# Patient Record
Sex: FEMALE | Race: WHITE | NOT HISPANIC OR LATINO
[De-identification: names, ages, dates, MRNs, and addresses within clinical notes are randomized per-mention and may not be internally consistent; named-entity substitution may affect disease eponyms.]

---

## 2018-10-30 ENCOUNTER — RESULT REVIEW (OUTPATIENT)
Age: 33
End: 2018-10-30

## 2020-01-28 ENCOUNTER — RESULT REVIEW (OUTPATIENT)
Age: 35
End: 2020-01-28

## 2020-09-14 ENCOUNTER — OUTPATIENT (OUTPATIENT)
Dept: OUTPATIENT SERVICES | Facility: HOSPITAL | Age: 35
LOS: 1 days | End: 2020-09-14
Payer: COMMERCIAL

## 2020-09-14 DIAGNOSIS — Z3A.00 WEEKS OF GESTATION OF PREGNANCY NOT SPECIFIED: ICD-10-CM

## 2020-09-14 DIAGNOSIS — O26.899 OTHER SPECIFIED PREGNANCY RELATED CONDITIONS, UNSPECIFIED TRIMESTER: ICD-10-CM

## 2020-09-14 PROCEDURE — 59025 FETAL NON-STRESS TEST: CPT

## 2020-09-14 PROCEDURE — G0463: CPT

## 2020-09-15 ENCOUNTER — INPATIENT (INPATIENT)
Facility: HOSPITAL | Age: 35
LOS: 1 days | Discharge: HOME | End: 2020-09-17
Attending: OBSTETRICS & GYNECOLOGY | Admitting: OBSTETRICS & GYNECOLOGY
Payer: COMMERCIAL

## 2020-09-15 VITALS — HEIGHT: 66 IN | WEIGHT: 147.93 LBS

## 2020-09-15 DIAGNOSIS — Z34.80 ENCOUNTER FOR SUPERVISION OF OTHER NORMAL PREGNANCY, UNSPECIFIED TRIMESTER: ICD-10-CM

## 2020-09-15 DIAGNOSIS — O26.899 OTHER SPECIFIED PREGNANCY RELATED CONDITIONS, UNSPECIFIED TRIMESTER: ICD-10-CM

## 2020-09-15 DIAGNOSIS — Z3A.00 WEEKS OF GESTATION OF PREGNANCY NOT SPECIFIED: ICD-10-CM

## 2020-09-15 LAB
ANISOCYTOSIS BLD QL: SLIGHT — SIGNIFICANT CHANGE UP
BASO STIPL BLD QL SMEAR: PRESENT — SIGNIFICANT CHANGE UP
BASOPHILS # BLD AUTO: 0 K/UL — SIGNIFICANT CHANGE UP (ref 0–0.2)
BASOPHILS NFR BLD AUTO: 0 % — SIGNIFICANT CHANGE UP (ref 0–2)
BLD GP AB SCN SERPL QL: NEGATIVE — SIGNIFICANT CHANGE UP
DACRYOCYTES BLD QL SMEAR: SLIGHT — SIGNIFICANT CHANGE UP
ELLIPTOCYTES BLD QL SMEAR: SLIGHT — SIGNIFICANT CHANGE UP
EOSINOPHIL # BLD AUTO: 0 K/UL — SIGNIFICANT CHANGE UP (ref 0–0.5)
EOSINOPHIL NFR BLD AUTO: 0 % — SIGNIFICANT CHANGE UP (ref 0–6)
GIANT PLATELETS BLD QL SMEAR: PRESENT — SIGNIFICANT CHANGE UP
HCT VFR BLD CALC: 32 % — LOW (ref 34.5–45)
HGB BLD-MCNC: 10 G/DL — LOW (ref 11.5–15.5)
LYMPHOCYTES # BLD AUTO: 1.32 K/UL — SIGNIFICANT CHANGE UP (ref 1–3.3)
LYMPHOCYTES # BLD AUTO: 9.6 % — LOW (ref 13–44)
MANUAL SMEAR VERIFICATION: SIGNIFICANT CHANGE UP
MCHC RBC-ENTMCNC: 22.4 PG — LOW (ref 27–34)
MCHC RBC-ENTMCNC: 31.3 GM/DL — LOW (ref 32–36)
MCV RBC AUTO: 71.7 FL — LOW (ref 80–100)
MICROCYTES BLD QL: SLIGHT — SIGNIFICANT CHANGE UP
MONOCYTES # BLD AUTO: 0.36 K/UL — SIGNIFICANT CHANGE UP (ref 0–0.9)
MONOCYTES NFR BLD AUTO: 2.6 % — SIGNIFICANT CHANGE UP (ref 2–14)
NEUTROPHILS # BLD AUTO: 12.09 K/UL — HIGH (ref 1.8–7.4)
NEUTROPHILS NFR BLD AUTO: 87.8 % — HIGH (ref 43–77)
PLAT MORPH BLD: NORMAL — SIGNIFICANT CHANGE UP
PLATELET # BLD AUTO: 255 K/UL — SIGNIFICANT CHANGE UP (ref 150–400)
POIKILOCYTOSIS BLD QL AUTO: SLIGHT — SIGNIFICANT CHANGE UP
POLYCHROMASIA BLD QL SMEAR: SLIGHT — SIGNIFICANT CHANGE UP
RBC # BLD: 4.46 M/UL — SIGNIFICANT CHANGE UP (ref 3.8–5.2)
RBC # FLD: 15.9 % — HIGH (ref 10.3–14.5)
RBC BLD AUTO: ABNORMAL
RH IG SCN BLD-IMP: POSITIVE — SIGNIFICANT CHANGE UP
SARS-COV-2 RNA SPEC QL NAA+PROBE: SIGNIFICANT CHANGE UP
WBC # BLD: 13.77 K/UL — HIGH (ref 3.8–10.5)
WBC # FLD AUTO: 13.77 K/UL — HIGH (ref 3.8–10.5)

## 2020-09-15 RX ORDER — OXYTOCIN 10 UNIT/ML
2 VIAL (ML) INJECTION
Qty: 30 | Refills: 0 | Status: DISCONTINUED | OUTPATIENT
Start: 2020-09-15 | End: 2020-09-17

## 2020-09-15 RX ORDER — OXYTOCIN 10 UNIT/ML
333.33 VIAL (ML) INJECTION
Qty: 20 | Refills: 0 | Status: DISCONTINUED | OUTPATIENT
Start: 2020-09-15 | End: 2020-09-16

## 2020-09-15 RX ORDER — INFLUENZA VIRUS VACCINE 15; 15; 15; 15 UG/.5ML; UG/.5ML; UG/.5ML; UG/.5ML
0.5 SUSPENSION INTRAMUSCULAR ONCE
Refills: 0 | Status: COMPLETED | OUTPATIENT
Start: 2020-09-15 | End: 2020-09-15

## 2020-09-15 RX ORDER — SODIUM CHLORIDE 9 MG/ML
1000 INJECTION, SOLUTION INTRAVENOUS
Refills: 0 | Status: DISCONTINUED | OUTPATIENT
Start: 2020-09-15 | End: 2020-09-16

## 2020-09-15 RX ORDER — CITRIC ACID/SODIUM CITRATE 300-500 MG
15 SOLUTION, ORAL ORAL EVERY 6 HOURS
Refills: 0 | Status: DISCONTINUED | OUTPATIENT
Start: 2020-09-15 | End: 2020-09-16

## 2020-09-15 RX ORDER — LEVOTHYROXINE SODIUM 125 MCG
1 TABLET ORAL
Qty: 0 | Refills: 0 | DISCHARGE

## 2020-09-15 RX ADMIN — SODIUM CHLORIDE 125 MILLILITER(S): 9 INJECTION, SOLUTION INTRAVENOUS at 21:12

## 2020-09-15 RX ADMIN — Medication 2 MILLIUNIT(S)/MIN: at 21:13

## 2020-09-16 LAB
SARS-COV-2 IGG SERPL QL IA: NEGATIVE — SIGNIFICANT CHANGE UP
SARS-COV-2 IGM SERPL IA-ACNC: 0.08 INDEX — SIGNIFICANT CHANGE UP
T PALLIDUM AB TITR SER: NEGATIVE — SIGNIFICANT CHANGE UP

## 2020-09-16 RX ORDER — IBUPROFEN 200 MG
600 TABLET ORAL EVERY 6 HOURS
Refills: 0 | Status: COMPLETED | OUTPATIENT
Start: 2020-09-16 | End: 2021-08-15

## 2020-09-16 RX ORDER — LANOLIN
1 OINTMENT (GRAM) TOPICAL EVERY 6 HOURS
Refills: 0 | Status: DISCONTINUED | OUTPATIENT
Start: 2020-09-16 | End: 2020-09-17

## 2020-09-16 RX ORDER — OXYCODONE HYDROCHLORIDE 5 MG/1
5 TABLET ORAL ONCE
Refills: 0 | Status: DISCONTINUED | OUTPATIENT
Start: 2020-09-16 | End: 2020-09-17

## 2020-09-16 RX ORDER — KETOROLAC TROMETHAMINE 30 MG/ML
30 SYRINGE (ML) INJECTION ONCE
Refills: 0 | Status: DISCONTINUED | OUTPATIENT
Start: 2020-09-16 | End: 2020-09-16

## 2020-09-16 RX ORDER — SODIUM CHLORIDE 9 MG/ML
3 INJECTION INTRAMUSCULAR; INTRAVENOUS; SUBCUTANEOUS EVERY 8 HOURS
Refills: 0 | Status: DISCONTINUED | OUTPATIENT
Start: 2020-09-16 | End: 2020-09-16

## 2020-09-16 RX ORDER — DIPHENHYDRAMINE HCL 50 MG
25 CAPSULE ORAL EVERY 6 HOURS
Refills: 0 | Status: DISCONTINUED | OUTPATIENT
Start: 2020-09-16 | End: 2020-09-17

## 2020-09-16 RX ORDER — OXYCODONE HYDROCHLORIDE 5 MG/1
5 TABLET ORAL
Refills: 0 | Status: DISCONTINUED | OUTPATIENT
Start: 2020-09-16 | End: 2020-09-17

## 2020-09-16 RX ORDER — HYDROCORTISONE 1 %
1 OINTMENT (GRAM) TOPICAL EVERY 6 HOURS
Refills: 0 | Status: DISCONTINUED | OUTPATIENT
Start: 2020-09-16 | End: 2020-09-17

## 2020-09-16 RX ORDER — LEVOTHYROXINE SODIUM 125 MCG
75 TABLET ORAL DAILY
Refills: 0 | Status: DISCONTINUED | OUTPATIENT
Start: 2020-09-16 | End: 2020-09-17

## 2020-09-16 RX ORDER — BENZOCAINE 10 %
1 GEL (GRAM) MUCOUS MEMBRANE EVERY 6 HOURS
Refills: 0 | Status: DISCONTINUED | OUTPATIENT
Start: 2020-09-16 | End: 2020-09-17

## 2020-09-16 RX ORDER — TETANUS TOXOID, REDUCED DIPHTHERIA TOXOID AND ACELLULAR PERTUSSIS VACCINE, ADSORBED 5; 2.5; 8; 8; 2.5 [IU]/.5ML; [IU]/.5ML; UG/.5ML; UG/.5ML; UG/.5ML
0.5 SUSPENSION INTRAMUSCULAR ONCE
Refills: 0 | Status: DISCONTINUED | OUTPATIENT
Start: 2020-09-16 | End: 2020-09-17

## 2020-09-16 RX ORDER — MAGNESIUM HYDROXIDE 400 MG/1
30 TABLET, CHEWABLE ORAL
Refills: 0 | Status: DISCONTINUED | OUTPATIENT
Start: 2020-09-16 | End: 2020-09-17

## 2020-09-16 RX ORDER — DIBUCAINE 1 %
1 OINTMENT (GRAM) RECTAL EVERY 6 HOURS
Refills: 0 | Status: DISCONTINUED | OUTPATIENT
Start: 2020-09-16 | End: 2020-09-17

## 2020-09-16 RX ORDER — OXYTOCIN 10 UNIT/ML
333.33 VIAL (ML) INJECTION
Qty: 20 | Refills: 0 | Status: DISCONTINUED | OUTPATIENT
Start: 2020-09-16 | End: 2020-09-17

## 2020-09-16 RX ORDER — AER TRAVELER 0.5 G/1
1 SOLUTION RECTAL; TOPICAL EVERY 4 HOURS
Refills: 0 | Status: DISCONTINUED | OUTPATIENT
Start: 2020-09-16 | End: 2020-09-17

## 2020-09-16 RX ORDER — SIMETHICONE 80 MG/1
80 TABLET, CHEWABLE ORAL EVERY 4 HOURS
Refills: 0 | Status: DISCONTINUED | OUTPATIENT
Start: 2020-09-16 | End: 2020-09-17

## 2020-09-16 RX ORDER — ACETAMINOPHEN 500 MG
975 TABLET ORAL
Refills: 0 | Status: DISCONTINUED | OUTPATIENT
Start: 2020-09-16 | End: 2020-09-17

## 2020-09-16 RX ORDER — PRAMOXINE HYDROCHLORIDE 150 MG/15G
1 AEROSOL, FOAM RECTAL EVERY 4 HOURS
Refills: 0 | Status: DISCONTINUED | OUTPATIENT
Start: 2020-09-16 | End: 2020-09-17

## 2020-09-16 RX ORDER — IBUPROFEN 200 MG
600 TABLET ORAL EVERY 6 HOURS
Refills: 0 | Status: DISCONTINUED | OUTPATIENT
Start: 2020-09-16 | End: 2020-09-17

## 2020-09-16 RX ADMIN — Medication 1 TABLET(S): at 13:44

## 2020-09-16 RX ADMIN — Medication 600 MILLIGRAM(S): at 14:14

## 2020-09-16 RX ADMIN — Medication 975 MILLIGRAM(S): at 15:33

## 2020-09-16 RX ADMIN — Medication 975 MILLIGRAM(S): at 11:05

## 2020-09-16 RX ADMIN — Medication 600 MILLIGRAM(S): at 21:53

## 2020-09-16 RX ADMIN — Medication 600 MILLIGRAM(S): at 20:36

## 2020-09-16 RX ADMIN — Medication 600 MILLIGRAM(S): at 13:44

## 2020-09-16 RX ADMIN — Medication 975 MILLIGRAM(S): at 21:53

## 2020-09-16 RX ADMIN — Medication 975 MILLIGRAM(S): at 15:03

## 2020-09-16 RX ADMIN — Medication 1000 MILLIUNIT(S)/MIN: at 06:19

## 2020-09-16 RX ADMIN — Medication 975 MILLIGRAM(S): at 21:54

## 2020-09-16 RX ADMIN — Medication 975 MILLIGRAM(S): at 10:35

## 2020-09-17 ENCOUNTER — TRANSCRIPTION ENCOUNTER (OUTPATIENT)
Age: 35
End: 2020-09-17

## 2020-09-17 VITALS
SYSTOLIC BLOOD PRESSURE: 99 MMHG | DIASTOLIC BLOOD PRESSURE: 66 MMHG | TEMPERATURE: 98 F | RESPIRATION RATE: 18 BRPM | HEART RATE: 83 BPM

## 2020-09-17 PROCEDURE — 86850 RBC ANTIBODY SCREEN: CPT

## 2020-09-17 PROCEDURE — U0003: CPT

## 2020-09-17 PROCEDURE — 86769 SARS-COV-2 COVID-19 ANTIBODY: CPT

## 2020-09-17 PROCEDURE — 86900 BLOOD TYPING SEROLOGIC ABO: CPT

## 2020-09-17 PROCEDURE — 86780 TREPONEMA PALLIDUM: CPT

## 2020-09-17 PROCEDURE — 59025 FETAL NON-STRESS TEST: CPT

## 2020-09-17 PROCEDURE — 86901 BLOOD TYPING SEROLOGIC RH(D): CPT

## 2020-09-17 PROCEDURE — 59050 FETAL MONITOR W/REPORT: CPT

## 2020-09-17 PROCEDURE — 85025 COMPLETE CBC W/AUTO DIFF WBC: CPT

## 2020-09-17 PROCEDURE — G0463: CPT

## 2020-09-17 RX ORDER — IBUPROFEN 200 MG
1 TABLET ORAL
Qty: 0 | Refills: 0 | DISCHARGE
Start: 2020-09-17

## 2020-09-17 RX ORDER — ACETAMINOPHEN 500 MG
3 TABLET ORAL
Qty: 0 | Refills: 0 | DISCHARGE
Start: 2020-09-17

## 2020-09-17 RX ADMIN — Medication 600 MILLIGRAM(S): at 03:00

## 2020-09-17 RX ADMIN — Medication 975 MILLIGRAM(S): at 04:06

## 2020-09-17 RX ADMIN — Medication 600 MILLIGRAM(S): at 08:22

## 2020-09-17 RX ADMIN — Medication 975 MILLIGRAM(S): at 05:06

## 2020-09-17 RX ADMIN — Medication 1 TABLET(S): at 11:20

## 2020-09-17 RX ADMIN — Medication 975 MILLIGRAM(S): at 11:20

## 2020-09-17 RX ADMIN — Medication 75 MICROGRAM(S): at 05:53

## 2020-09-17 RX ADMIN — Medication 975 MILLIGRAM(S): at 11:50

## 2020-09-17 RX ADMIN — Medication 600 MILLIGRAM(S): at 02:30

## 2020-09-17 NOTE — PROGRESS NOTE ADULT - PROBLEM SELECTOR PLAN 1
- Continue with po analgesia, pain well controlled  - Increase ambulation, SCDs when not ambulating  - Continue regular diet  - No evidence of ongoing bleeding    Michell Rosenberg, PGY1
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
## 2020-09-17 NOTE — PROGRESS NOTE ADULT - SUBJECTIVE AND OBJECTIVE BOX
OB Progress Note:  PPD#1    S: 34yo PPD#1 s/p . Patient feels well. Pain is well controlled. She is tolerating a regular diet and passing flatus. She is voiding spontaneously, and ambulating without difficulty. Endorses light vaginal bleeding, soaking less than 1 pad/hour. Denies CP/SOB. Denies lightheadedness/dizziness. Denies N/V.     O:  Vitals:  Vital Signs Last 24 Hrs  T(C): 36.6 (17 Sep 2020 01:10), Max: 36.9 (16 Sep 2020 16:59)  T(F): 97.9 (17 Sep 2020 01:10), Max: 98.5 (16 Sep 2020 16:59)  HR: 96 (17 Sep 2020 01:10) (70 - 105)  BP: 109/71 (17 Sep 2020 01:10) (102/64 - 135/64)  BP(mean): --  RR: 18 (17 Sep 2020 01:10) (18 - 18)  SpO2: 97% (17 Sep 2020 01:10) (97% - 99%)    MEDICATIONS  (STANDING):  acetaminophen   Tablet .. 975 milliGRAM(s) Oral <User Schedule>  diphtheria/tetanus/pertussis (acellular) Vaccine (ADAcel) 0.5 milliLiter(s) IntraMuscular once  ibuprofen  Tablet. 600 milliGRAM(s) Oral every 6 hours  influenza   Vaccine 0.5 milliLiter(s) IntraMuscular once  levothyroxine 75 MICROGram(s) Oral daily  oxytocin Infusion 333.333 milliUNIT(s)/Min (1000 mL/Hr) IV Continuous <Continuous>  oxytocin Infusion 2 milliUNIT(s)/Min (2 mL/Hr) IV Continuous <Continuous>  prenatal multivitamin 1 Tablet(s) Oral daily      Labs:  Blood type: A Positive  Rubella IgG: RPR: Negative                          10.0<L>   13.77<H> >-----------< 255    ( 09-15 @ 19:37 )             32.0<L>                  Physical Exam:  General: NAD  Heart: all extremities well perfused  Lungs: breathing comfortably  Abdomen: soft, non-tender, non-distended, fundus firm  Vaginal: lochia wnl  Extremities: No calf tenderness or erythema

## 2020-09-17 NOTE — DISCHARGE NOTE OB - CARE PLAN
Principal Discharge DX:	Vaginal delivery  Goal:	home  Assessment and plan of treatment:	follow up in 6 weeks

## 2020-09-17 NOTE — DISCHARGE NOTE OB - PATIENT PORTAL LINK FT
You can access the FollowMyHealth Patient Portal offered by [facility] by registering at the following website: [email or web address]. By joining [employer]’s FollowMyHealth portal, you will also be able to view your health information using other applications (apps) compatible with our system.

## 2020-09-17 NOTE — DISCHARGE NOTE OB - MEDICATION SUMMARY - MEDICATIONS TO TAKE
I will START or STAY ON the medications listed below when I get home from the hospital:    acetaminophen 325 mg oral tablet  -- 3 tab(s) by mouth   -- Indication: For mild pain    ibuprofen 600 mg oral tablet  -- 1 tab(s) by mouth every 6 hours  -- Indication: For moderate pain    Prenatal Multivitamins oral tablet (obsolete)  -- 1 cap(s) by mouth once a day  -- Indication: For postpartum

## 2020-11-02 ENCOUNTER — RESULT REVIEW (OUTPATIENT)
Age: 35
End: 2020-11-02

## 2021-05-20 ENCOUNTER — RX RENEWAL (OUTPATIENT)
Age: 36
End: 2021-05-20

## 2021-05-24 ENCOUNTER — RX RENEWAL (OUTPATIENT)
Age: 36
End: 2021-05-24

## 2021-07-02 ENCOUNTER — NON-APPOINTMENT (OUTPATIENT)
Age: 36
End: 2021-07-02

## 2021-12-26 ENCOUNTER — NON-APPOINTMENT (OUTPATIENT)
Age: 36
End: 2021-12-26

## 2021-12-27 ENCOUNTER — APPOINTMENT (OUTPATIENT)
Dept: OBGYN | Facility: CLINIC | Age: 36
End: 2021-12-27
Payer: COMMERCIAL

## 2021-12-27 VITALS
WEIGHT: 134 LBS | DIASTOLIC BLOOD PRESSURE: 79 MMHG | HEIGHT: 67 IN | SYSTOLIC BLOOD PRESSURE: 116 MMHG | BODY MASS INDEX: 21.03 KG/M2

## 2021-12-27 DIAGNOSIS — Z00.00 ENCOUNTER FOR GENERAL ADULT MEDICAL EXAMINATION W/OUT ABNORMAL FINDINGS: ICD-10-CM

## 2021-12-27 DIAGNOSIS — Z31.69 ENCOUNTER FOR OTHER GENERAL COUNSELING AND ADVICE ON PROCREATION: ICD-10-CM

## 2021-12-27 DIAGNOSIS — Z30.09 ENCOUNTER FOR OTHER GENERAL COUNSELING AND ADVICE ON CONTRACEPTION: ICD-10-CM

## 2021-12-27 PROCEDURE — 99395 PREV VISIT EST AGE 18-39: CPT

## 2021-12-27 PROCEDURE — 36415 COLL VENOUS BLD VENIPUNCTURE: CPT

## 2021-12-27 NOTE — HISTORY OF PRESENT ILLNESS
[FreeTextEntry1] : 2021. KIRILL MACHADO 36 year old female  LMP 21, presents for annual gyn exam, PMH\par \par She feels well and has no complaints***.\par PMHx hypothyroid  and anemia \par \par preconception counseling last pregnancy was IUI and hypothyroid.  \par Cycle 31-34 days\par \par She reports monthly menses. She denies intermenstrual bleeding, abn vaginal discharge or vaginitis symptoms. No urinary complaints. BM is normal per patient. She denies abdominal and pelvic pain.\par \par Pt is sexually active with .  Denies sexual dysfunction.\par

## 2021-12-27 NOTE — PLAN
[FreeTextEntry1] : Routine Gyn Exam:\par BSE taught\par Pap smear conducted\par \par Preconception counseling - \par check TSH T4 h/o hypothyroid in pregnancy\par preeseed lubricant, myoinositol, start PNV\par \par \par RTO in 1 year or PRN\par

## 2021-12-28 LAB — HPV HIGH+LOW RISK DNA PNL CVX: NOT DETECTED

## 2021-12-30 LAB
T4 FREE SERPL-MCNC: 1.1 NG/DL
TSH SERPL-ACNC: 5.13 UIU/ML

## 2022-01-05 LAB — CYTOLOGY CVX/VAG DOC THIN PREP: NORMAL

## 2022-01-10 ENCOUNTER — TRANSCRIPTION ENCOUNTER (OUTPATIENT)
Age: 37
End: 2022-01-10

## 2022-06-09 ENCOUNTER — TRANSCRIPTION ENCOUNTER (OUTPATIENT)
Age: 37
End: 2022-06-09

## 2022-06-10 ENCOUNTER — INPATIENT (INPATIENT)
Facility: HOSPITAL | Age: 37
LOS: 0 days | Discharge: HOME | DRG: 817 | End: 2022-06-10
Attending: OBSTETRICS & GYNECOLOGY | Admitting: OBSTETRICS & GYNECOLOGY
Payer: COMMERCIAL

## 2022-06-10 ENCOUNTER — APPOINTMENT (OUTPATIENT)
Dept: OBGYN | Facility: CLINIC | Age: 37
End: 2022-06-10
Payer: COMMERCIAL

## 2022-06-10 ENCOUNTER — APPOINTMENT (OUTPATIENT)
Dept: OBGYN | Facility: CLINIC | Age: 37
End: 2022-06-10

## 2022-06-10 ENCOUNTER — RESULT REVIEW (OUTPATIENT)
Age: 37
End: 2022-06-10

## 2022-06-10 ENCOUNTER — ASOB RESULT (OUTPATIENT)
Age: 37
End: 2022-06-10

## 2022-06-10 ENCOUNTER — TRANSCRIPTION ENCOUNTER (OUTPATIENT)
Age: 37
End: 2022-06-10

## 2022-06-10 VITALS
OXYGEN SATURATION: 98 % | SYSTOLIC BLOOD PRESSURE: 105 MMHG | RESPIRATION RATE: 16 BRPM | HEART RATE: 99 BPM | DIASTOLIC BLOOD PRESSURE: 58 MMHG

## 2022-06-10 VITALS
DIASTOLIC BLOOD PRESSURE: 84 MMHG | HEART RATE: 126 BPM | RESPIRATION RATE: 19 BRPM | WEIGHT: 130.07 LBS | OXYGEN SATURATION: 100 % | HEIGHT: 66 IN | SYSTOLIC BLOOD PRESSURE: 122 MMHG | TEMPERATURE: 98 F

## 2022-06-10 DIAGNOSIS — O00.90 UNSPECIFIED ECTOPIC PREGNANCY WITHOUT INTRAUTERINE PREGNANCY: ICD-10-CM

## 2022-06-10 LAB
ALBUMIN SERPL ELPH-MCNC: 4.9 G/DL — SIGNIFICANT CHANGE UP (ref 3.3–5)
ALP SERPL-CCNC: 19 U/L — LOW (ref 40–120)
ALT FLD-CCNC: 13 U/L — SIGNIFICANT CHANGE UP (ref 10–45)
ANION GAP SERPL CALC-SCNC: 15 MMOL/L — SIGNIFICANT CHANGE UP (ref 5–17)
ANISOCYTOSIS BLD QL: SLIGHT — SIGNIFICANT CHANGE UP
APTT BLD: 30.4 SEC — SIGNIFICANT CHANGE UP (ref 27.5–35.5)
AST SERPL-CCNC: 14 U/L — SIGNIFICANT CHANGE UP (ref 10–40)
BASOPHILS # BLD AUTO: 0.09 K/UL — SIGNIFICANT CHANGE UP (ref 0–0.2)
BASOPHILS NFR BLD AUTO: 0.8 % — SIGNIFICANT CHANGE UP (ref 0–2)
BILIRUB SERPL-MCNC: 0.5 MG/DL — SIGNIFICANT CHANGE UP (ref 0.2–1.2)
BLD GP AB SCN SERPL QL: NEGATIVE — SIGNIFICANT CHANGE UP
BUN SERPL-MCNC: 12 MG/DL — SIGNIFICANT CHANGE UP (ref 7–23)
CALCIUM SERPL-MCNC: 9.5 MG/DL — SIGNIFICANT CHANGE UP (ref 8.4–10.5)
CHLORIDE SERPL-SCNC: 103 MMOL/L — SIGNIFICANT CHANGE UP (ref 96–108)
CO2 SERPL-SCNC: 20 MMOL/L — LOW (ref 22–31)
CREAT SERPL-MCNC: 0.77 MG/DL — SIGNIFICANT CHANGE UP (ref 0.5–1.3)
DACRYOCYTES BLD QL SMEAR: SLIGHT — SIGNIFICANT CHANGE UP
EGFR: 103 ML/MIN/1.73M2 — SIGNIFICANT CHANGE UP
ELLIPTOCYTES BLD QL SMEAR: SLIGHT — SIGNIFICANT CHANGE UP
EOSINOPHIL # BLD AUTO: 0 K/UL — SIGNIFICANT CHANGE UP (ref 0–0.5)
EOSINOPHIL NFR BLD AUTO: 0 % — SIGNIFICANT CHANGE UP (ref 0–6)
GLUCOSE SERPL-MCNC: 110 MG/DL — HIGH (ref 70–99)
HCG SERPL-ACNC: 2628 MIU/ML — HIGH
HCT VFR BLD CALC: 30.1 % — LOW (ref 34.5–45)
HGB BLD-MCNC: 9.5 G/DL — LOW (ref 11.5–15.5)
INR BLD: 1.17 RATIO — HIGH (ref 0.88–1.16)
LACTATE BLDV-MCNC: 1.6 MMOL/L — SIGNIFICANT CHANGE UP (ref 0.7–2)
LYMPHOCYTES # BLD AUTO: 18.8 % — SIGNIFICANT CHANGE UP (ref 13–44)
LYMPHOCYTES # BLD AUTO: 2 K/UL — SIGNIFICANT CHANGE UP (ref 1–3.3)
MANUAL SMEAR VERIFICATION: SIGNIFICANT CHANGE UP
MCHC RBC-ENTMCNC: 21.6 PG — LOW (ref 27–34)
MCHC RBC-ENTMCNC: 31.6 GM/DL — LOW (ref 32–36)
MCV RBC AUTO: 68.6 FL — LOW (ref 80–100)
MONOCYTES # BLD AUTO: 0.28 K/UL — SIGNIFICANT CHANGE UP (ref 0–0.9)
MONOCYTES NFR BLD AUTO: 2.6 % — SIGNIFICANT CHANGE UP (ref 2–14)
NEUTROPHILS # BLD AUTO: 8.29 K/UL — HIGH (ref 1.8–7.4)
NEUTROPHILS NFR BLD AUTO: 77.8 % — HIGH (ref 43–77)
PLAT MORPH BLD: NORMAL — SIGNIFICANT CHANGE UP
PLATELET # BLD AUTO: 280 K/UL — SIGNIFICANT CHANGE UP (ref 150–400)
POIKILOCYTOSIS BLD QL AUTO: SLIGHT — SIGNIFICANT CHANGE UP
POTASSIUM SERPL-MCNC: 3.1 MMOL/L — LOW (ref 3.5–5.3)
POTASSIUM SERPL-SCNC: 3.1 MMOL/L — LOW (ref 3.5–5.3)
PROT SERPL-MCNC: 8.3 G/DL — SIGNIFICANT CHANGE UP (ref 6–8.3)
PROTHROM AB SERPL-ACNC: 13.5 SEC — HIGH (ref 10.5–13.4)
RBC # BLD: 4.39 M/UL — SIGNIFICANT CHANGE UP (ref 3.8–5.2)
RBC # FLD: 14.3 % — SIGNIFICANT CHANGE UP (ref 10.3–14.5)
RBC BLD AUTO: ABNORMAL
RH IG SCN BLD-IMP: POSITIVE — SIGNIFICANT CHANGE UP
SARS-COV-2 RNA SPEC QL NAA+PROBE: SIGNIFICANT CHANGE UP
SCHISTOCYTES BLD QL AUTO: SLIGHT — SIGNIFICANT CHANGE UP
SODIUM SERPL-SCNC: 138 MMOL/L — SIGNIFICANT CHANGE UP (ref 135–145)
TARGETS BLD QL SMEAR: SLIGHT — SIGNIFICANT CHANGE UP
WBC # BLD: 10.66 K/UL — HIGH (ref 3.8–10.5)
WBC # FLD AUTO: 10.66 K/UL — HIGH (ref 3.8–10.5)

## 2022-06-10 PROCEDURE — 99285 EMERGENCY DEPT VISIT HI MDM: CPT

## 2022-06-10 PROCEDURE — 83605 ASSAY OF LACTIC ACID: CPT

## 2022-06-10 PROCEDURE — 80053 COMPREHEN METABOLIC PANEL: CPT

## 2022-06-10 PROCEDURE — 99215 OFFICE O/P EST HI 40 MIN: CPT | Mod: 25

## 2022-06-10 PROCEDURE — 58661 LAPAROSCOPY REMOVE ADNEXA: CPT

## 2022-06-10 PROCEDURE — 85025 COMPLETE CBC W/AUTO DIFF WBC: CPT

## 2022-06-10 PROCEDURE — 76817 TRANSVAGINAL US OBSTETRIC: CPT

## 2022-06-10 PROCEDURE — 88305 TISSUE EXAM BY PATHOLOGIST: CPT | Mod: 26

## 2022-06-10 PROCEDURE — 86901 BLOOD TYPING SEROLOGIC RH(D): CPT

## 2022-06-10 PROCEDURE — 84702 CHORIONIC GONADOTROPIN TEST: CPT

## 2022-06-10 PROCEDURE — C9399: CPT

## 2022-06-10 PROCEDURE — 36415 COLL VENOUS BLD VENIPUNCTURE: CPT

## 2022-06-10 PROCEDURE — 88305 TISSUE EXAM BY PATHOLOGIST: CPT

## 2022-06-10 PROCEDURE — 86900 BLOOD TYPING SEROLOGIC ABO: CPT

## 2022-06-10 PROCEDURE — 59151 TREAT ECTOPIC PREGNANCY: CPT

## 2022-06-10 PROCEDURE — 85610 PROTHROMBIN TIME: CPT

## 2022-06-10 PROCEDURE — 85730 THROMBOPLASTIN TIME PARTIAL: CPT

## 2022-06-10 PROCEDURE — 86850 RBC ANTIBODY SCREEN: CPT

## 2022-06-10 PROCEDURE — U0003: CPT

## 2022-06-10 PROCEDURE — 59151 TREAT ECTOPIC PREGNANCY: CPT | Mod: 80

## 2022-06-10 RX ORDER — HYDROMORPHONE HYDROCHLORIDE 2 MG/ML
0.5 INJECTION INTRAMUSCULAR; INTRAVENOUS; SUBCUTANEOUS
Refills: 0 | Status: DISCONTINUED | OUTPATIENT
Start: 2022-06-10 | End: 2022-06-10

## 2022-06-10 RX ORDER — OXYCODONE HYDROCHLORIDE 5 MG/1
1 TABLET ORAL
Qty: 6 | Refills: 0
Start: 2022-06-10

## 2022-06-10 RX ORDER — OXYCODONE HYDROCHLORIDE 5 MG/1
5 TABLET ORAL ONCE
Refills: 0 | Status: DISCONTINUED | OUTPATIENT
Start: 2022-06-10 | End: 2022-06-10

## 2022-06-10 RX ORDER — SODIUM CHLORIDE 9 MG/ML
1000 INJECTION, SOLUTION INTRAVENOUS
Refills: 0 | Status: DISCONTINUED | OUTPATIENT
Start: 2022-06-10 | End: 2022-06-10

## 2022-06-10 RX ORDER — ONDANSETRON 8 MG/1
4 TABLET, FILM COATED ORAL ONCE
Refills: 0 | Status: DISCONTINUED | OUTPATIENT
Start: 2022-06-10 | End: 2022-06-10

## 2022-06-10 RX ORDER — FENTANYL CITRATE 50 UG/ML
25 INJECTION INTRAVENOUS
Refills: 0 | Status: DISCONTINUED | OUTPATIENT
Start: 2022-06-10 | End: 2022-06-10

## 2022-06-10 RX ADMIN — OXYCODONE HYDROCHLORIDE 5 MILLIGRAM(S): 5 TABLET ORAL at 18:35

## 2022-06-10 RX ADMIN — FENTANYL CITRATE 25 MICROGRAM(S): 50 INJECTION INTRAVENOUS at 16:30

## 2022-06-10 RX ADMIN — OXYCODONE HYDROCHLORIDE 5 MILLIGRAM(S): 5 TABLET ORAL at 18:05

## 2022-06-10 RX ADMIN — FENTANYL CITRATE 25 MICROGRAM(S): 50 INJECTION INTRAVENOUS at 16:45

## 2022-06-10 NOTE — ED PROVIDER NOTE - NS ED ROS FT
GENERAL: No fever or chills  EYES: No change in vision  HEENT: No trouble swallowing or speaking  CARDIAC: No chest pain  PULMONARY: No cough or SOB  GI: + abdominal pain, no nausea or no vomiting, no diarrhea or constipation  : No changes in urination, + vaginal bleeding  SKIN: No rashes  NEURO: No headache, no numbness  MSK: No joint pain  Otherwise as HPI or negative.

## 2022-06-10 NOTE — ED CLERICAL - NS ED CLERK NOTE PRE-ARRIVAL INFORMATION; ADDITIONAL PRE-ARRIVAL INFORMATION
CC/Reason For referral:  known ruptured ectopic with heavy bleeding, sent for OR  Preferred Consultant(if applicable): na  Who admits for you (if needed): Dr Marsh on call  Do you have documents you would like to fax over? no   Would you still like to speak to an ED attending? call gyn

## 2022-06-10 NOTE — PRE-ANESTHESIA EVALUATION ADULT - NSANTHPMHFT_GEN_ALL_CORE
37  LMP  6w5d here with RLQ pain found to have right adnexal fluid and ruptured ectopic pregnancy. Pt has a history of hypothryoidism, and thalassemia minor.

## 2022-06-10 NOTE — H&P ADULT - ATTENDING COMMENTS
pt seen and examined, agree with evaluation and assessment above. Pt with a confirmed ruptured ectopic. plan for OR. risks benefits d/w pt

## 2022-06-10 NOTE — ED PROVIDER NOTE - EMPLOYMENT
Patient transported to [address] [place] [address], [address] [place] [address]  07/20/20 7314 N/A

## 2022-06-10 NOTE — CHART NOTE - NSCHARTNOTEFT_GEN_A_CORE
OBGYN PA   Post Op Eval    S: Pt seen and evaluated. Pt feels well overall, reports being sore at the incisional sites. She tolerated sips of water and cookies. Denies CP, SOB, n/v, abdominal pain, leg pain.     O:  ICU Vital Signs Last 24 Hrs  T(C): 36.3 (10 Tate 2022 15:12), Max: 36.9 (10 Tate 2022 11:20)  T(F): 97.3 (10 Tate 2022 15:12), Max: 98.4 (10 Tate 2022 11:20)  HR: 96 (10 Tate 2022 17:00) (88 - 126)  BP: 108/56 (10 Tate 2022 17:00) (98/54 - 122/84)  BP(mean): 73 (10 Tate 2022 16:45) (71 - 82)  RR: 16 (10 Tate 2022 17:00) (16 - 19)  SpO2: 98% (10 Tate 2022 17:00) (97% - 100%)  gen: NAD  cards: clear S1S2, RRR  Pulm: CTA b/l  abd: soft, gravid. mildly tender to palpation.  Incisional sites c/d/i   : No vaginal bleeding.   Ext: normal LE b/l.    10cc/1200cc/200cc intra operatively    A/P: 38 y/o female w/ ruptured ectopic pregnancy now s/p lsc right salpingectomy, evacuation of 250cc hemoperitoneum, doing well.  -DTV  -for ambulation  -when pt hemodynamically stable and meeting postop milestones, pt to be discharged home. Partner has filled patient prescriptions. Pt understands prognosis and to f/u in office with primary OBGYN for incisional check and reproductive counseling.  d/w gyn team.  REX Hamlin

## 2022-06-10 NOTE — ED PROVIDER NOTE - CLINICAL SUMMARY MEDICAL DECISION MAKING FREE TEXT BOX
37 Y F presenting with RLq pain, concern for ruptured ectopic pregnancy on outside imaging, initially tachycardic with normal BP, pre-operative planning, re-assessment 37 Y F presenting with RLq pain, concern for ruptured ectopic pregnancy on outside imaging, initially tachycardic with normal BP, pre-operative planning, re-assessment  ATTG: : abd pain with outpt US demonstrating ectopic, will check labs, OB eval

## 2022-06-10 NOTE — ASU DISCHARGE PLAN (ADULT/PEDIATRIC) - NS MD DC FALL RISK RISK
For information on Fall & Injury Prevention, visit: [email or web address] OR  [email or web address] OR  https://www.cdc.gov/steadi/patient.html

## 2022-06-10 NOTE — ED PROVIDER NOTE - ATTENDING CONTRIBUTION TO CARE
38 y/o f  EGA 6 weeks, presents from outpt office for concern of a ruptured ectopic pregnancy. She was seen earlier in the OB office with US suggesting free fluid and ectopic. Here with  at the bedside. no vomiting. + abd pain  Gen.  no acute distress  HEENT:  perrl eomi  Lungs:  b/l bs  CVS: S1S2   Abd;  soft, no distention no guarding. + abd tenderness on right lower quad  Ext: no pitting edema no erythema  Neuro: aaox3  MSK: strength 5/5 b/l upper and lower ext

## 2022-06-10 NOTE — BRIEF OPERATIVE NOTE - OPERATION/FINDINGS
250cc of hemoperitoneum noted in posterior cul de sac  Right tubal pregnancy noted aborting out of fimbriated end  Ureter visualized transperitoneally  Grossly normal left fallopian tube and bilateral ovaries

## 2022-06-10 NOTE — ED PROVIDER NOTE - OBJECTIVE STATEMENT
37 Y F  presenting 6 weeks pregnant per LMP for concern of ruptured ectopic. Per patient was at OBGYn office due to several days of vaginal bleeding RLQ pain, evaluated outpatient found to have ectopic pregnancy RLQ. Concern for rupture. Denies any CP, SOB, nausea, vomiting, dysuria.

## 2022-06-10 NOTE — H&P ADULT - NSHPPOADEEPVENOUSTHROMB_GEN_A_CORE
influenza, injectable, quadrivalent, preservative free; 14-Oct-2021 13:35; Nany Guerrero (RN); [employer]; [id number] (Exp. Date: 30-Jun-2022); IntraMuscular; Deltoid Right.; 0.5 milliLiter(s); VIS (VIS Published: 06-Aug-2021, VIS Presented: 14-Oct-2021);   Tdap; 22-Dec-2021 19:23; Clementina Koehler (MARTÍNEZ); [employer]; [id number] (Exp. Date: 09-Sep-2023); IntraMuscular; Deltoid Left.; 0.5 milliLiter(s); VIS (VIS Published: 09-May-2013, VIS Presented: 22-Dec-2021);   
no

## 2022-06-10 NOTE — H&P ADULT - HISTORY OF PRESENT ILLNESS
36y/o  LMP  at 6w5d based on LMP sent from Dr. Abreu's office with ruptured ectopic pregnancy. Pt reports RLQ pain that started overnight, intermittently worsening. Pt also notes onset of vaginal bleeding around 3am, describes bleeding like a light period. Denies lightheadeness/dizziness. Denies f/c/n/v. NPO since 7pm.  In Dr. Abreu's office, TVUS performed, R adnexal mass noted with free fluid in the pelvis.     OBHx:   5#, chemical pregnancy  GYNhx: denies fibroids, ovarian cysts, STIs, endometriosis  PMHx: thalassemia minor, hypothyroid  PSHx: denies  Meds: synthroid  All: NKDA  SH: neg x3

## 2022-06-10 NOTE — ED PROVIDER NOTE - PHYSICAL EXAMINATION
Physical Exam:  General: NAD, Conversive  Eyes: EOMI, Conjunctia and sclera clear  Neck: No JVD  Lungs: Clear to auscultation bilaterally, no wheeze, no rhonchi  Heart: Normal S1, S2, no murmurs  Abdomen: Soft, mild RLQ tenderness,  nondistended, no CVA tenderness  Extremities: 2+ peripheral pulses, no edema  Psych: AAO X3  Neurologic: Non-focal

## 2022-06-10 NOTE — H&P ADULT - ASSESSMENT
36y/o  LMP  at 6w5d based on LMP sent from Dr. Abreu's office with ruptured ectopic pregnancy. Pt noted to be tachycardia on arrival. Tenderness across lower abdomen noted on physical exam. Pt admitted for surgical management.   -Pt added on to OR schedule emergently due to hemoperitoneum  -Consents signed for EUA, laparoscopic right salpingectomy, possible left salpingectomy, possible laparotomy. Risks/benefits/alteratives explained to pt and partner, including bleeding, infection, damage to surrounding organs, and pain. Questions elicited and answered.   -NPO, LR@125  -Rh + , rhogam not indicated   -Type and cross 2u prbc for OR       D/w Dr. Lois connors pgy4

## 2022-06-10 NOTE — H&P ADULT - NSHPPHYSICALEXAM_GEN_ALL_CORE
GA: NAD, nervous appearing  Pulm: CTAB  Cards: RRR, S1 S2 WNL  Abd: soft, tender across lower abdomen, no guarding  LE: no edema/tenderness

## 2022-06-10 NOTE — ED ADULT NURSE NOTE - OBJECTIVE STATEMENT
38 yo F with pmhx of   was sent by her Obgyn for diagnosed ectopic pregnancy. 36y/o  LMP  at 6w5d based on LMP sent from Dr. Abreu's office with ruptured ectopic pregnancy. Pt denies pain or discomfort at this time. reports rlq that began last night associated with vaginal bleeding. denies chest pain, shortness of breath, headache, nausea, vomiting, diarrhea, abdominal pain, fevers, chills, urinary symptoms.

## 2022-06-13 PROBLEM — E03.9 HYPOTHYROIDISM, UNSPECIFIED: Chronic | Status: ACTIVE | Noted: 2022-06-10

## 2022-06-13 PROBLEM — D56.3 THALASSEMIA MINOR: Chronic | Status: ACTIVE | Noted: 2022-06-10

## 2022-06-14 ENCOUNTER — NON-APPOINTMENT (OUTPATIENT)
Age: 37
End: 2022-06-14

## 2022-06-20 ENCOUNTER — APPOINTMENT (OUTPATIENT)
Dept: OBGYN | Facility: CLINIC | Age: 37
End: 2022-06-20

## 2022-06-21 LAB — SURGICAL PATHOLOGY STUDY: SIGNIFICANT CHANGE UP

## 2022-06-24 ENCOUNTER — APPOINTMENT (OUTPATIENT)
Dept: OBGYN | Facility: CLINIC | Age: 37
End: 2022-06-24

## 2022-06-24 DIAGNOSIS — O00.101 RIGHT TUBAL PREGNANCY WITHOUT INTRAUTERINE PREGNANCY: ICD-10-CM

## 2022-06-24 DIAGNOSIS — K66.1 RIGHT TUBAL PREGNANCY WITHOUT INTRAUTERINE PREGNANCY: ICD-10-CM

## 2022-06-24 DIAGNOSIS — Z09 ENCOUNTER FOR FOLLOW-UP EXAMINATION AFTER COMPLETED TREATMENT FOR CONDITIONS OTHER THAN MALIGNANT NEOPLASM: ICD-10-CM

## 2022-06-24 PROCEDURE — 99024 POSTOP FOLLOW-UP VISIT: CPT

## 2022-06-24 RX ORDER — LEVOTHYROXINE SODIUM 75 UG/1
75 TABLET ORAL DAILY
Qty: 90 | Refills: 3 | Status: ACTIVE | COMMUNITY
Start: 2022-06-24 | End: 1900-01-01

## 2022-12-30 ENCOUNTER — NON-APPOINTMENT (OUTPATIENT)
Age: 37
End: 2022-12-30

## 2023-01-25 ENCOUNTER — APPOINTMENT (OUTPATIENT)
Dept: OBGYN | Facility: CLINIC | Age: 38
End: 2023-01-25
Payer: COMMERCIAL

## 2023-01-25 VITALS
SYSTOLIC BLOOD PRESSURE: 123 MMHG | HEIGHT: 67 IN | HEART RATE: 78 BPM | BODY MASS INDEX: 19.3 KG/M2 | WEIGHT: 123 LBS | DIASTOLIC BLOOD PRESSURE: 81 MMHG

## 2023-01-25 DIAGNOSIS — Z01.419 ENCOUNTER FOR GYNECOLOGICAL EXAMINATION (GENERAL) (ROUTINE) W/OUT ABNORMAL FINDINGS: ICD-10-CM

## 2023-01-25 PROCEDURE — 99395 PREV VISIT EST AGE 18-39: CPT

## 2023-01-31 LAB
CYTOLOGY CVX/VAG DOC THIN PREP: NORMAL
HPV HIGH+LOW RISK DNA PNL CVX: NOT DETECTED

## 2023-06-26 ENCOUNTER — LABORATORY RESULT (OUTPATIENT)
Age: 38
End: 2023-06-26

## 2023-06-26 ENCOUNTER — ASOB RESULT (OUTPATIENT)
Age: 38
End: 2023-06-26

## 2023-06-26 ENCOUNTER — APPOINTMENT (OUTPATIENT)
Dept: OBGYN | Facility: CLINIC | Age: 38
End: 2023-06-26
Payer: COMMERCIAL

## 2023-06-26 VITALS — BODY MASS INDEX: 19.26 KG/M2 | WEIGHT: 123 LBS | DIASTOLIC BLOOD PRESSURE: 78 MMHG | SYSTOLIC BLOOD PRESSURE: 121 MMHG

## 2023-06-26 DIAGNOSIS — Z3A.08 8 WEEKS GESTATION OF PREGNANCY: ICD-10-CM

## 2023-06-26 DIAGNOSIS — O21.9 VOMITING OF PREGNANCY, UNSPECIFIED: ICD-10-CM

## 2023-06-26 PROCEDURE — 0500F INITIAL PRENATAL CARE VISIT: CPT

## 2023-06-26 PROCEDURE — 76801 OB US < 14 WKS SINGLE FETUS: CPT

## 2023-06-26 PROCEDURE — 36415 COLL VENOUS BLD VENIPUNCTURE: CPT

## 2023-06-26 RX ORDER — METOCLOPRAMIDE 5 MG/1
5 TABLET ORAL
Qty: 20 | Refills: 1 | Status: ACTIVE | COMMUNITY
Start: 2023-06-26 | End: 1900-01-01

## 2023-06-26 RX ORDER — DOXYLAMINE SUCCINATE AND PYRIDOXINE HYDROCHLORIDE 20; 20 MG/1; MG/1
20-20 TABLET, EXTENDED RELEASE ORAL
Qty: 30 | Refills: 1 | Status: ACTIVE | COMMUNITY
Start: 2023-06-26 | End: 1900-01-01

## 2023-06-27 ENCOUNTER — NON-APPOINTMENT (OUTPATIENT)
Age: 38
End: 2023-06-27

## 2023-06-27 LAB
ALBUMIN SERPL ELPH-MCNC: 4.5 G/DL
ALP BLD-CCNC: 13 U/L
ALT SERPL-CCNC: 12 U/L
ANION GAP SERPL CALC-SCNC: 14 MMOL/L
AST SERPL-CCNC: 15 U/L
BILIRUB SERPL-MCNC: 0.3 MG/DL
BUN SERPL-MCNC: 11 MG/DL
CALCIUM SERPL-MCNC: 9.3 MG/DL
CHLORIDE SERPL-SCNC: 101 MMOL/L
CO2 SERPL-SCNC: 21 MMOL/L
CREAT SERPL-MCNC: 0.65 MG/DL
EGFR: 117 ML/MIN/1.73M2
GLUCOSE SERPL-MCNC: 66 MG/DL
HBV SURFACE AG SER QL: NONREACTIVE
HCV AB SER QL: NONREACTIVE
HCV S/CO RATIO: 0.15 S/CO
HIV1+2 AB SPEC QL IA.RAPID: NONREACTIVE
MEV IGG FLD QL IA: 150 AU/ML
MEV IGG+IGM SER-IMP: POSITIVE
POTASSIUM SERPL-SCNC: 4.3 MMOL/L
PROT SERPL-MCNC: 7.2 G/DL
RUBV IGG FLD-ACNC: 2.2 INDEX
RUBV IGG SER-IMP: POSITIVE
SODIUM SERPL-SCNC: 137 MMOL/L
T PALLIDUM AB SER QL IA: NEGATIVE
T4 FREE SERPL-MCNC: 1.4 NG/DL
TSH SERPL-ACNC: 1.39 UIU/ML
VZV AB TITR SER: POSITIVE
VZV IGG SER IF-ACNC: 1206 INDEX

## 2023-07-03 LAB
ABO + RH PNL BLD: NORMAL
BACTERIA UR CULT: NORMAL
BLD GP AB SCN SERPL QL: NORMAL
C TRACH RRNA SPEC QL NAA+PROBE: NOT DETECTED
HGB A MFR BLD: 95.4 %
HGB A2 MFR BLD: 4.3 %
HGB F MFR BLD: 0.3 %
HGB FRACT BLD-IMP: NORMAL
LEAD BLD-MCNC: <1 UG/DL
N GONORRHOEA RRNA SPEC QL NAA+PROBE: NOT DETECTED
SOURCE AMPLIFICATION: NORMAL

## 2023-07-06 ENCOUNTER — RX RENEWAL (OUTPATIENT)
Age: 38
End: 2023-07-06

## 2023-07-06 RX ORDER — LEVOTHYROXINE SODIUM 0.07 MG/1
75 TABLET ORAL DAILY
Qty: 90 | Refills: 3 | Status: ACTIVE | COMMUNITY
Start: 2021-02-26 | End: 1900-01-01

## 2023-07-10 ENCOUNTER — APPOINTMENT (OUTPATIENT)
Dept: OBGYN | Facility: CLINIC | Age: 38
End: 2023-07-10
Payer: COMMERCIAL

## 2023-07-10 DIAGNOSIS — Z3A.10 10 WEEKS GESTATION OF PREGNANCY: ICD-10-CM

## 2023-07-10 PROCEDURE — 36415 COLL VENOUS BLD VENIPUNCTURE: CPT

## 2023-07-10 PROCEDURE — 0502F SUBSEQUENT PRENATAL CARE: CPT

## 2023-07-14 ENCOUNTER — NON-APPOINTMENT (OUTPATIENT)
Age: 38
End: 2023-07-14

## 2023-07-17 ENCOUNTER — NON-APPOINTMENT (OUTPATIENT)
Age: 38
End: 2023-07-17

## 2023-07-18 ENCOUNTER — NON-APPOINTMENT (OUTPATIENT)
Age: 38
End: 2023-07-18

## 2023-07-20 ENCOUNTER — ASOB RESULT (OUTPATIENT)
Age: 38
End: 2023-07-20

## 2023-07-20 ENCOUNTER — APPOINTMENT (OUTPATIENT)
Dept: OBGYN | Facility: CLINIC | Age: 38
End: 2023-07-20
Payer: COMMERCIAL

## 2023-07-20 VITALS — WEIGHT: 122 LBS | DIASTOLIC BLOOD PRESSURE: 76 MMHG | SYSTOLIC BLOOD PRESSURE: 110 MMHG | BODY MASS INDEX: 19.11 KG/M2

## 2023-07-20 DIAGNOSIS — Z3A.12 12 WEEKS GESTATION OF PREGNANCY: ICD-10-CM

## 2023-07-20 PROCEDURE — 0502F SUBSEQUENT PRENATAL CARE: CPT

## 2023-07-20 PROCEDURE — 76813 OB US NUCHAL MEAS 1 GEST: CPT

## 2023-08-15 ENCOUNTER — ASOB RESULT (OUTPATIENT)
Age: 38
End: 2023-08-15

## 2023-08-15 ENCOUNTER — APPOINTMENT (OUTPATIENT)
Dept: OBGYN | Facility: CLINIC | Age: 38
End: 2023-08-15
Payer: COMMERCIAL

## 2023-08-15 VITALS — DIASTOLIC BLOOD PRESSURE: 70 MMHG | BODY MASS INDEX: 19.26 KG/M2 | WEIGHT: 123 LBS | SYSTOLIC BLOOD PRESSURE: 114 MMHG

## 2023-08-15 DIAGNOSIS — Z3A.16 16 WEEKS GESTATION OF PREGNANCY: ICD-10-CM

## 2023-08-15 PROCEDURE — 76815 OB US LIMITED FETUS(S): CPT

## 2023-08-15 PROCEDURE — 0502F SUBSEQUENT PRENATAL CARE: CPT

## 2023-08-15 PROCEDURE — 36415 COLL VENOUS BLD VENIPUNCTURE: CPT

## 2023-08-30 ENCOUNTER — ASOB RESULT (OUTPATIENT)
Age: 38
End: 2023-08-30

## 2023-08-30 ENCOUNTER — NON-APPOINTMENT (OUTPATIENT)
Age: 38
End: 2023-08-30

## 2023-08-30 ENCOUNTER — APPOINTMENT (OUTPATIENT)
Dept: OBGYN | Facility: CLINIC | Age: 38
End: 2023-08-30
Payer: COMMERCIAL

## 2023-08-30 VITALS
HEIGHT: 67 IN | SYSTOLIC BLOOD PRESSURE: 104 MMHG | BODY MASS INDEX: 19.78 KG/M2 | WEIGHT: 126 LBS | DIASTOLIC BLOOD PRESSURE: 62 MMHG

## 2023-08-30 DIAGNOSIS — Z3A.18 18 WEEKS GESTATION OF PREGNANCY: ICD-10-CM
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
## 2023-08-30 PROCEDURE — 0502F SUBSEQUENT PRENATAL CARE: CPT

## 2023-08-30 PROCEDURE — 76815 OB US LIMITED FETUS(S): CPT

## 2023-08-31 LAB
AFP MOM: 1.05
AFP VALUE: 41.3 NG/ML
ALPHA FETOPROTEIN SERUM COMMENT: NORMAL
ALPHA FETOPROTEIN SERUM INTERPRETATION: NORMAL
ALPHA FETOPROTEIN SERUM RESULTS: NORMAL
ALPHA FETOPROTEIN SERUM TEST RESULTS: NORMAL
GESTATIONAL AGE BASED ON: NORMAL
GESTATIONAL AGE ON COLLECTION DATE: 16 WEEKS
INSULIN DEP DIABETES: NO
MATERNAL AGE AT EDD AFP: 36.9 YR
MULTIPLE GESTATION: NO
OSBR RISK 1 IN: NORMAL
RACE: NORMAL
WEIGHT AFP: 123 LBS

## 2023-09-12 ENCOUNTER — ASOB RESULT (OUTPATIENT)
Age: 38
End: 2023-09-12

## 2023-09-12 ENCOUNTER — APPOINTMENT (OUTPATIENT)
Dept: OBGYN | Facility: CLINIC | Age: 38
End: 2023-09-12
Payer: COMMERCIAL

## 2023-09-12 VITALS — BODY MASS INDEX: 19.73 KG/M2 | DIASTOLIC BLOOD PRESSURE: 74 MMHG | WEIGHT: 126 LBS | SYSTOLIC BLOOD PRESSURE: 120 MMHG

## 2023-09-12 DIAGNOSIS — Z3A.20 20 WEEKS GESTATION OF PREGNANCY: ICD-10-CM

## 2023-09-12 DIAGNOSIS — O99.280 ENDOCRINE, NUTRITIONAL AND METABOLIC DISEASES COMPLICATING PREGNANCY, UNSPECIFIED TRIMESTER: ICD-10-CM

## 2023-09-12 DIAGNOSIS — E03.9 ENDOCRINE, NUTRITIONAL AND METABOLIC DISEASES COMPLICATING PREGNANCY, UNSPECIFIED TRIMESTER: ICD-10-CM

## 2023-09-12 PROCEDURE — 76805 OB US >/= 14 WKS SNGL FETUS: CPT

## 2023-09-12 PROCEDURE — 90471 IMMUNIZATION ADMIN: CPT

## 2023-09-12 PROCEDURE — 90686 IIV4 VACC NO PRSV 0.5 ML IM: CPT

## 2023-09-12 PROCEDURE — 0502F SUBSEQUENT PRENATAL CARE: CPT

## 2023-09-13 LAB
T4 FREE SERPL-MCNC: 1.2 NG/DL
TSH SERPL-ACNC: 1.65 UIU/ML

## 2023-09-21 ENCOUNTER — APPOINTMENT (OUTPATIENT)
Dept: ANTEPARTUM | Facility: CLINIC | Age: 38
End: 2023-09-21
Payer: COMMERCIAL

## 2023-09-21 ENCOUNTER — ASOB RESULT (OUTPATIENT)
Age: 38
End: 2023-09-21

## 2023-09-21 PROCEDURE — 76811 OB US DETAILED SNGL FETUS: CPT

## 2023-10-03 ENCOUNTER — NON-APPOINTMENT (OUTPATIENT)
Age: 38
End: 2023-10-03

## 2023-10-11 ENCOUNTER — APPOINTMENT (OUTPATIENT)
Dept: OBGYN | Facility: CLINIC | Age: 38
End: 2023-10-11
Payer: COMMERCIAL

## 2023-10-11 ENCOUNTER — ASOB RESULT (OUTPATIENT)
Age: 38
End: 2023-10-11

## 2023-10-11 VITALS
DIASTOLIC BLOOD PRESSURE: 80 MMHG | WEIGHT: 130 LBS | BODY MASS INDEX: 20.4 KG/M2 | HEIGHT: 67 IN | SYSTOLIC BLOOD PRESSURE: 126 MMHG

## 2023-10-11 PROCEDURE — 0502F SUBSEQUENT PRENATAL CARE: CPT

## 2023-10-11 PROCEDURE — 76816 OB US FOLLOW-UP PER FETUS: CPT

## 2023-11-06 ENCOUNTER — APPOINTMENT (OUTPATIENT)
Dept: OBGYN | Facility: CLINIC | Age: 38
End: 2023-11-06
Payer: COMMERCIAL

## 2023-11-06 ENCOUNTER — ASOB RESULT (OUTPATIENT)
Age: 38
End: 2023-11-06

## 2023-11-06 VITALS — DIASTOLIC BLOOD PRESSURE: 78 MMHG | SYSTOLIC BLOOD PRESSURE: 131 MMHG | BODY MASS INDEX: 21.61 KG/M2 | WEIGHT: 138 LBS

## 2023-11-06 DIAGNOSIS — Z3A.28 28 WEEKS GESTATION OF PREGNANCY: ICD-10-CM

## 2023-11-06 DIAGNOSIS — Z34.90 ENCOUNTER FOR SUPERVISION OF NORMAL PREGNANCY, UNSPECIFIED, UNSPECIFIED TRIMESTER: ICD-10-CM

## 2023-11-06 DIAGNOSIS — Z23 ENCOUNTER FOR IMMUNIZATION: ICD-10-CM

## 2023-11-06 PROCEDURE — 90715 TDAP VACCINE 7 YRS/> IM: CPT

## 2023-11-06 PROCEDURE — 0502F SUBSEQUENT PRENATAL CARE: CPT

## 2023-11-06 PROCEDURE — 90471 IMMUNIZATION ADMIN: CPT

## 2023-11-06 PROCEDURE — 76816 OB US FOLLOW-UP PER FETUS: CPT

## 2023-11-08 ENCOUNTER — NON-APPOINTMENT (OUTPATIENT)
Age: 38
End: 2023-11-08

## 2023-11-08 ENCOUNTER — APPOINTMENT (OUTPATIENT)
Dept: OBGYN | Facility: CLINIC | Age: 38
End: 2023-11-08

## 2023-11-08 LAB
BASOPHILS # BLD AUTO: 0.02 K/UL
BASOPHILS NFR BLD AUTO: 0.2 %
BLD GP AB SCN SERPL QL: NORMAL
EOSINOPHIL # BLD AUTO: 0.04 K/UL
EOSINOPHIL NFR BLD AUTO: 0.3 %
GLUCOSE 1H P 50 G GLC PO SERPL-MCNC: 132 MG/DL
HCT VFR BLD CALC: 29.5 %
HGB BLD-MCNC: 9.3 G/DL
HIV1+2 AB SPEC QL IA.RAPID: NONREACTIVE
IMM GRANULOCYTES NFR BLD AUTO: 0.5 %
LYMPHOCYTES # BLD AUTO: 2.08 K/UL
LYMPHOCYTES NFR BLD AUTO: 17.9 %
MAN DIFF?: NORMAL
MCHC RBC-ENTMCNC: 22.5 PG
MCHC RBC-ENTMCNC: 31.5 GM/DL
MCV RBC AUTO: 71.3 FL
MONOCYTES # BLD AUTO: 0.44 K/UL
MONOCYTES NFR BLD AUTO: 3.8 %
NEUTROPHILS # BLD AUTO: 9.01 K/UL
NEUTROPHILS NFR BLD AUTO: 77.3 %
PLATELET # BLD AUTO: 299 K/UL
RBC # BLD: 4.14 M/UL
RBC # FLD: 14.9 %
WBC # FLD AUTO: 11.65 K/UL

## 2023-11-09 ENCOUNTER — NON-APPOINTMENT (OUTPATIENT)
Age: 38
End: 2023-11-09

## 2023-11-29 ENCOUNTER — ASOB RESULT (OUTPATIENT)
Age: 38
End: 2023-11-29

## 2023-11-29 ENCOUNTER — APPOINTMENT (OUTPATIENT)
Dept: OBGYN | Facility: CLINIC | Age: 38
End: 2023-11-29
Payer: COMMERCIAL

## 2023-11-29 VITALS — WEIGHT: 142 LBS | BODY MASS INDEX: 22.24 KG/M2 | DIASTOLIC BLOOD PRESSURE: 82 MMHG | SYSTOLIC BLOOD PRESSURE: 131 MMHG

## 2023-11-29 PROCEDURE — 76819 FETAL BIOPHYS PROFIL W/O NST: CPT | Mod: 59

## 2023-11-29 PROCEDURE — 76816 OB US FOLLOW-UP PER FETUS: CPT

## 2023-11-29 PROCEDURE — 0502F SUBSEQUENT PRENATAL CARE: CPT

## 2023-12-14 ENCOUNTER — NON-APPOINTMENT (OUTPATIENT)
Age: 38
End: 2023-12-14

## 2023-12-14 ENCOUNTER — APPOINTMENT (OUTPATIENT)
Dept: OBGYN | Facility: CLINIC | Age: 38
End: 2023-12-14

## 2023-12-20 ENCOUNTER — ASOB RESULT (OUTPATIENT)
Age: 38
End: 2023-12-20

## 2023-12-20 ENCOUNTER — APPOINTMENT (OUTPATIENT)
Dept: OBGYN | Facility: CLINIC | Age: 38
End: 2023-12-20
Payer: COMMERCIAL

## 2023-12-20 VITALS
SYSTOLIC BLOOD PRESSURE: 123 MMHG | HEIGHT: 67 IN | WEIGHT: 146 LBS | BODY MASS INDEX: 22.91 KG/M2 | DIASTOLIC BLOOD PRESSURE: 77 MMHG

## 2023-12-20 DIAGNOSIS — Z3A.34 34 WEEKS GESTATION OF PREGNANCY: ICD-10-CM

## 2023-12-20 PROCEDURE — 76816 OB US FOLLOW-UP PER FETUS: CPT

## 2023-12-20 PROCEDURE — 76819 FETAL BIOPHYS PROFIL W/O NST: CPT | Mod: 59

## 2023-12-20 PROCEDURE — 0502F SUBSEQUENT PRENATAL CARE: CPT

## 2023-12-27 ENCOUNTER — NON-APPOINTMENT (OUTPATIENT)
Age: 38
End: 2023-12-27

## 2023-12-28 ENCOUNTER — APPOINTMENT (OUTPATIENT)
Dept: OBGYN | Facility: CLINIC | Age: 38
End: 2023-12-28
Payer: COMMERCIAL

## 2023-12-28 VITALS
BODY MASS INDEX: 24.01 KG/M2 | HEIGHT: 67 IN | WEIGHT: 153 LBS | SYSTOLIC BLOOD PRESSURE: 130 MMHG | DIASTOLIC BLOOD PRESSURE: 82 MMHG

## 2023-12-28 DIAGNOSIS — O43.199 OTHER MALFORMATION OF PLACENTA, UNSPECIFIED TRIMESTER: ICD-10-CM

## 2023-12-28 DIAGNOSIS — Z3A.35 35 WEEKS GESTATION OF PREGNANCY: ICD-10-CM

## 2023-12-28 DIAGNOSIS — O09.529 SUPERVISION OF ELDERLY MULTIGRAVIDA, UNSPECIFIED TRIMESTER: ICD-10-CM

## 2023-12-28 PROCEDURE — 0502F SUBSEQUENT PRENATAL CARE: CPT

## 2024-01-02 ENCOUNTER — ASOB RESULT (OUTPATIENT)
Age: 39
End: 2024-01-02

## 2024-01-02 ENCOUNTER — NON-APPOINTMENT (OUTPATIENT)
Age: 39
End: 2024-01-02

## 2024-01-02 ENCOUNTER — APPOINTMENT (OUTPATIENT)
Dept: OBGYN | Facility: CLINIC | Age: 39
End: 2024-01-02
Payer: COMMERCIAL

## 2024-01-02 VITALS
DIASTOLIC BLOOD PRESSURE: 91 MMHG | SYSTOLIC BLOOD PRESSURE: 139 MMHG | HEIGHT: 67 IN | BODY MASS INDEX: 23.86 KG/M2 | WEIGHT: 152 LBS

## 2024-01-02 DIAGNOSIS — Z3A.36 36 WEEKS GESTATION OF PREGNANCY: ICD-10-CM

## 2024-01-02 PROCEDURE — 76819 FETAL BIOPHYS PROFIL W/O NST: CPT | Mod: 59

## 2024-01-02 PROCEDURE — 76816 OB US FOLLOW-UP PER FETUS: CPT | Mod: 59

## 2024-01-02 PROCEDURE — 0502F SUBSEQUENT PRENATAL CARE: CPT

## 2024-01-05 ENCOUNTER — APPOINTMENT (OUTPATIENT)
Dept: OBGYN | Facility: CLINIC | Age: 39
End: 2024-01-05

## 2024-01-10 ENCOUNTER — APPOINTMENT (OUTPATIENT)
Dept: OBGYN | Facility: CLINIC | Age: 39
End: 2024-01-10
Payer: COMMERCIAL

## 2024-01-10 ENCOUNTER — ASOB RESULT (OUTPATIENT)
Age: 39
End: 2024-01-10

## 2024-01-10 ENCOUNTER — INPATIENT (INPATIENT)
Facility: HOSPITAL | Age: 39
LOS: 1 days | Discharge: HOME | End: 2024-01-12
Attending: OBSTETRICS & GYNECOLOGY | Admitting: OBSTETRICS & GYNECOLOGY
Payer: COMMERCIAL

## 2024-01-10 ENCOUNTER — NON-APPOINTMENT (OUTPATIENT)
Age: 39
End: 2024-01-10

## 2024-01-10 VITALS
OXYGEN SATURATION: 95 % | TEMPERATURE: 99 F | RESPIRATION RATE: 18 BRPM | DIASTOLIC BLOOD PRESSURE: 93 MMHG | SYSTOLIC BLOOD PRESSURE: 140 MMHG | HEART RATE: 108 BPM

## 2024-01-10 VITALS — SYSTOLIC BLOOD PRESSURE: 145 MMHG | BODY MASS INDEX: 24.12 KG/M2 | WEIGHT: 154 LBS | DIASTOLIC BLOOD PRESSURE: 91 MMHG

## 2024-01-10 VITALS — SYSTOLIC BLOOD PRESSURE: 143 MMHG | DIASTOLIC BLOOD PRESSURE: 94 MMHG

## 2024-01-10 DIAGNOSIS — Z34.80 ENCOUNTER FOR SUPERVISION OF OTHER NORMAL PREGNANCY, UNSPECIFIED TRIMESTER: ICD-10-CM

## 2024-01-10 DIAGNOSIS — O26.899 OTHER SPECIFIED PREGNANCY RELATED CONDITIONS, UNSPECIFIED TRIMESTER: ICD-10-CM

## 2024-01-10 LAB
ALBUMIN SERPL ELPH-MCNC: 3.8 G/DL — SIGNIFICANT CHANGE UP (ref 3.3–5)
ALBUMIN SERPL ELPH-MCNC: 3.8 G/DL — SIGNIFICANT CHANGE UP (ref 3.3–5)
ALP SERPL-CCNC: 167 U/L — HIGH (ref 40–120)
ALP SERPL-CCNC: 167 U/L — HIGH (ref 40–120)
ALT FLD-CCNC: 18 U/L — SIGNIFICANT CHANGE UP (ref 10–45)
ALT FLD-CCNC: 18 U/L — SIGNIFICANT CHANGE UP (ref 10–45)
ANION GAP SERPL CALC-SCNC: 12 MMOL/L — SIGNIFICANT CHANGE UP (ref 5–17)
ANION GAP SERPL CALC-SCNC: 12 MMOL/L — SIGNIFICANT CHANGE UP (ref 5–17)
ANISOCYTOSIS BLD QL: SLIGHT — SIGNIFICANT CHANGE UP
ANISOCYTOSIS BLD QL: SLIGHT — SIGNIFICANT CHANGE UP
APPEARANCE UR: CLEAR — SIGNIFICANT CHANGE UP
APPEARANCE UR: CLEAR — SIGNIFICANT CHANGE UP
APTT BLD: 26.7 SEC — SIGNIFICANT CHANGE UP (ref 24.5–35.6)
APTT BLD: 26.7 SEC — SIGNIFICANT CHANGE UP (ref 24.5–35.6)
AST SERPL-CCNC: 22 U/L — SIGNIFICANT CHANGE UP (ref 10–40)
AST SERPL-CCNC: 22 U/L — SIGNIFICANT CHANGE UP (ref 10–40)
BACTERIA # UR AUTO: ABNORMAL /HPF
BACTERIA # UR AUTO: ABNORMAL /HPF
BASOPHILS # BLD AUTO: 0 K/UL — SIGNIFICANT CHANGE UP (ref 0–0.2)
BASOPHILS # BLD AUTO: 0 K/UL — SIGNIFICANT CHANGE UP (ref 0–0.2)
BASOPHILS NFR BLD AUTO: 0 % — SIGNIFICANT CHANGE UP (ref 0–2)
BASOPHILS NFR BLD AUTO: 0 % — SIGNIFICANT CHANGE UP (ref 0–2)
BILIRUB SERPL-MCNC: 0.2 MG/DL — SIGNIFICANT CHANGE UP (ref 0.2–1.2)
BILIRUB SERPL-MCNC: 0.2 MG/DL — SIGNIFICANT CHANGE UP (ref 0.2–1.2)
BILIRUB UR-MCNC: NEGATIVE — SIGNIFICANT CHANGE UP
BILIRUB UR-MCNC: NEGATIVE — SIGNIFICANT CHANGE UP
BLD GP AB SCN SERPL QL: NEGATIVE — SIGNIFICANT CHANGE UP
BLD GP AB SCN SERPL QL: NEGATIVE — SIGNIFICANT CHANGE UP
BUN SERPL-MCNC: 11 MG/DL — SIGNIFICANT CHANGE UP (ref 7–23)
BUN SERPL-MCNC: 11 MG/DL — SIGNIFICANT CHANGE UP (ref 7–23)
CALCIUM SERPL-MCNC: 9.8 MG/DL — SIGNIFICANT CHANGE UP (ref 8.4–10.5)
CALCIUM SERPL-MCNC: 9.8 MG/DL — SIGNIFICANT CHANGE UP (ref 8.4–10.5)
CAST: 0 /LPF — SIGNIFICANT CHANGE UP (ref 0–4)
CAST: 0 /LPF — SIGNIFICANT CHANGE UP (ref 0–4)
CHLORIDE SERPL-SCNC: 104 MMOL/L — SIGNIFICANT CHANGE UP (ref 96–108)
CHLORIDE SERPL-SCNC: 104 MMOL/L — SIGNIFICANT CHANGE UP (ref 96–108)
CO2 SERPL-SCNC: 22 MMOL/L — SIGNIFICANT CHANGE UP (ref 22–31)
CO2 SERPL-SCNC: 22 MMOL/L — SIGNIFICANT CHANGE UP (ref 22–31)
COLOR SPEC: YELLOW — SIGNIFICANT CHANGE UP
COLOR SPEC: YELLOW — SIGNIFICANT CHANGE UP
CREAT ?TM UR-MCNC: 28 MG/DL — SIGNIFICANT CHANGE UP
CREAT ?TM UR-MCNC: 28 MG/DL — SIGNIFICANT CHANGE UP
CREAT SERPL-MCNC: 0.78 MG/DL — SIGNIFICANT CHANGE UP (ref 0.5–1.3)
CREAT SERPL-MCNC: 0.78 MG/DL — SIGNIFICANT CHANGE UP (ref 0.5–1.3)
DACRYOCYTES BLD QL SMEAR: SLIGHT — SIGNIFICANT CHANGE UP
DACRYOCYTES BLD QL SMEAR: SLIGHT — SIGNIFICANT CHANGE UP
DIFF PNL FLD: NEGATIVE — SIGNIFICANT CHANGE UP
DIFF PNL FLD: NEGATIVE — SIGNIFICANT CHANGE UP
EGFR: 101 ML/MIN/1.73M2 — SIGNIFICANT CHANGE UP
EGFR: 101 ML/MIN/1.73M2 — SIGNIFICANT CHANGE UP
ELLIPTOCYTES BLD QL SMEAR: SLIGHT — SIGNIFICANT CHANGE UP
ELLIPTOCYTES BLD QL SMEAR: SLIGHT — SIGNIFICANT CHANGE UP
EOSINOPHIL # BLD AUTO: 0 K/UL — SIGNIFICANT CHANGE UP (ref 0–0.5)
EOSINOPHIL # BLD AUTO: 0 K/UL — SIGNIFICANT CHANGE UP (ref 0–0.5)
EOSINOPHIL NFR BLD AUTO: 0 % — SIGNIFICANT CHANGE UP (ref 0–6)
EOSINOPHIL NFR BLD AUTO: 0 % — SIGNIFICANT CHANGE UP (ref 0–6)
FIBRINOGEN PPP-MCNC: 540 MG/DL — HIGH (ref 200–445)
FIBRINOGEN PPP-MCNC: 540 MG/DL — HIGH (ref 200–445)
GLUCOSE SERPL-MCNC: 79 MG/DL — SIGNIFICANT CHANGE UP (ref 70–99)
GLUCOSE SERPL-MCNC: 79 MG/DL — SIGNIFICANT CHANGE UP (ref 70–99)
GLUCOSE UR QL: NEGATIVE MG/DL — SIGNIFICANT CHANGE UP
GLUCOSE UR QL: NEGATIVE MG/DL — SIGNIFICANT CHANGE UP
HCT VFR BLD CALC: 31.7 % — LOW (ref 34.5–45)
HCT VFR BLD CALC: 31.7 % — LOW (ref 34.5–45)
HGB BLD-MCNC: 10 G/DL — LOW (ref 11.5–15.5)
HGB BLD-MCNC: 10 G/DL — LOW (ref 11.5–15.5)
INR BLD: 0.93 RATIO — SIGNIFICANT CHANGE UP (ref 0.85–1.18)
INR BLD: 0.93 RATIO — SIGNIFICANT CHANGE UP (ref 0.85–1.18)
KETONES UR-MCNC: NEGATIVE MG/DL — SIGNIFICANT CHANGE UP
KETONES UR-MCNC: NEGATIVE MG/DL — SIGNIFICANT CHANGE UP
LDH SERPL L TO P-CCNC: 224 U/L — SIGNIFICANT CHANGE UP (ref 50–242)
LDH SERPL L TO P-CCNC: 224 U/L — SIGNIFICANT CHANGE UP (ref 50–242)
LEUKOCYTE ESTERASE UR-ACNC: ABNORMAL
LEUKOCYTE ESTERASE UR-ACNC: ABNORMAL
LYMPHOCYTES # BLD AUTO: 26.1 % — SIGNIFICANT CHANGE UP (ref 13–44)
LYMPHOCYTES # BLD AUTO: 26.1 % — SIGNIFICANT CHANGE UP (ref 13–44)
LYMPHOCYTES # BLD AUTO: 3.45 K/UL — HIGH (ref 1–3.3)
LYMPHOCYTES # BLD AUTO: 3.45 K/UL — HIGH (ref 1–3.3)
MACROCYTES BLD QL: SLIGHT — SIGNIFICANT CHANGE UP
MACROCYTES BLD QL: SLIGHT — SIGNIFICANT CHANGE UP
MANUAL SMEAR VERIFICATION: SIGNIFICANT CHANGE UP
MANUAL SMEAR VERIFICATION: SIGNIFICANT CHANGE UP
MCHC RBC-ENTMCNC: 22 PG — LOW (ref 27–34)
MCHC RBC-ENTMCNC: 22 PG — LOW (ref 27–34)
MCHC RBC-ENTMCNC: 31.5 GM/DL — LOW (ref 32–36)
MCHC RBC-ENTMCNC: 31.5 GM/DL — LOW (ref 32–36)
MCV RBC AUTO: 69.7 FL — LOW (ref 80–100)
MCV RBC AUTO: 69.7 FL — LOW (ref 80–100)
MICROCYTES BLD QL: SIGNIFICANT CHANGE UP
MICROCYTES BLD QL: SIGNIFICANT CHANGE UP
MONOCYTES # BLD AUTO: 0.12 K/UL — SIGNIFICANT CHANGE UP (ref 0–0.9)
MONOCYTES # BLD AUTO: 0.12 K/UL — SIGNIFICANT CHANGE UP (ref 0–0.9)
MONOCYTES NFR BLD AUTO: 0.9 % — LOW (ref 2–14)
MONOCYTES NFR BLD AUTO: 0.9 % — LOW (ref 2–14)
NEUTROPHILS # BLD AUTO: 9.64 K/UL — HIGH (ref 1.8–7.4)
NEUTROPHILS # BLD AUTO: 9.64 K/UL — HIGH (ref 1.8–7.4)
NEUTROPHILS NFR BLD AUTO: 73 % — SIGNIFICANT CHANGE UP (ref 43–77)
NEUTROPHILS NFR BLD AUTO: 73 % — SIGNIFICANT CHANGE UP (ref 43–77)
NITRITE UR-MCNC: NEGATIVE — SIGNIFICANT CHANGE UP
NITRITE UR-MCNC: NEGATIVE — SIGNIFICANT CHANGE UP
OVALOCYTES BLD QL SMEAR: SLIGHT — SIGNIFICANT CHANGE UP
OVALOCYTES BLD QL SMEAR: SLIGHT — SIGNIFICANT CHANGE UP
PH UR: 7 — SIGNIFICANT CHANGE UP (ref 5–8)
PH UR: 7 — SIGNIFICANT CHANGE UP (ref 5–8)
PLAT MORPH BLD: NORMAL — SIGNIFICANT CHANGE UP
PLAT MORPH BLD: NORMAL — SIGNIFICANT CHANGE UP
PLATELET # BLD AUTO: 259 K/UL — SIGNIFICANT CHANGE UP (ref 150–400)
PLATELET # BLD AUTO: 259 K/UL — SIGNIFICANT CHANGE UP (ref 150–400)
POLYCHROMASIA BLD QL SMEAR: SLIGHT — SIGNIFICANT CHANGE UP
POLYCHROMASIA BLD QL SMEAR: SLIGHT — SIGNIFICANT CHANGE UP
POTASSIUM SERPL-MCNC: 4.5 MMOL/L — SIGNIFICANT CHANGE UP (ref 3.5–5.3)
POTASSIUM SERPL-MCNC: 4.5 MMOL/L — SIGNIFICANT CHANGE UP (ref 3.5–5.3)
POTASSIUM SERPL-SCNC: 4.5 MMOL/L — SIGNIFICANT CHANGE UP (ref 3.5–5.3)
POTASSIUM SERPL-SCNC: 4.5 MMOL/L — SIGNIFICANT CHANGE UP (ref 3.5–5.3)
PROT ?TM UR-MCNC: 11 MG/DL — SIGNIFICANT CHANGE UP (ref 0–12)
PROT ?TM UR-MCNC: 11 MG/DL — SIGNIFICANT CHANGE UP (ref 0–12)
PROT SERPL-MCNC: 7.4 G/DL — SIGNIFICANT CHANGE UP (ref 6–8.3)
PROT SERPL-MCNC: 7.4 G/DL — SIGNIFICANT CHANGE UP (ref 6–8.3)
PROT UR-MCNC: NEGATIVE MG/DL — SIGNIFICANT CHANGE UP
PROT UR-MCNC: NEGATIVE MG/DL — SIGNIFICANT CHANGE UP
PROT/CREAT UR-RTO: 0.4 RATIO — HIGH (ref 0–0.2)
PROT/CREAT UR-RTO: 0.4 RATIO — HIGH (ref 0–0.2)
PROTHROM AB SERPL-ACNC: 9.8 SEC — SIGNIFICANT CHANGE UP (ref 9.5–13)
PROTHROM AB SERPL-ACNC: 9.8 SEC — SIGNIFICANT CHANGE UP (ref 9.5–13)
RBC # BLD: 4.55 M/UL — SIGNIFICANT CHANGE UP (ref 3.8–5.2)
RBC # BLD: 4.55 M/UL — SIGNIFICANT CHANGE UP (ref 3.8–5.2)
RBC # FLD: 16.4 % — HIGH (ref 10.3–14.5)
RBC # FLD: 16.4 % — HIGH (ref 10.3–14.5)
RBC BLD AUTO: ABNORMAL
RBC BLD AUTO: ABNORMAL
RBC CASTS # UR COMP ASSIST: 1 /HPF — SIGNIFICANT CHANGE UP (ref 0–4)
RBC CASTS # UR COMP ASSIST: 1 /HPF — SIGNIFICANT CHANGE UP (ref 0–4)
RH IG SCN BLD-IMP: POSITIVE — SIGNIFICANT CHANGE UP
RH IG SCN BLD-IMP: POSITIVE — SIGNIFICANT CHANGE UP
SODIUM SERPL-SCNC: 138 MMOL/L — SIGNIFICANT CHANGE UP (ref 135–145)
SODIUM SERPL-SCNC: 138 MMOL/L — SIGNIFICANT CHANGE UP (ref 135–145)
SP GR SPEC: 1 — SIGNIFICANT CHANGE UP (ref 1–1.03)
SP GR SPEC: 1 — SIGNIFICANT CHANGE UP (ref 1–1.03)
SQUAMOUS # UR AUTO: 5 /HPF — SIGNIFICANT CHANGE UP (ref 0–5)
SQUAMOUS # UR AUTO: 5 /HPF — SIGNIFICANT CHANGE UP (ref 0–5)
URATE SERPL-MCNC: 3.5 MG/DL — SIGNIFICANT CHANGE UP (ref 2.5–7)
URATE SERPL-MCNC: 3.5 MG/DL — SIGNIFICANT CHANGE UP (ref 2.5–7)
UROBILINOGEN FLD QL: 0.2 MG/DL — SIGNIFICANT CHANGE UP (ref 0.2–1)
UROBILINOGEN FLD QL: 0.2 MG/DL — SIGNIFICANT CHANGE UP (ref 0.2–1)
WBC # BLD: 13.2 K/UL — HIGH (ref 3.8–10.5)
WBC # BLD: 13.2 K/UL — HIGH (ref 3.8–10.5)
WBC # FLD AUTO: 13.2 K/UL — HIGH (ref 3.8–10.5)
WBC # FLD AUTO: 13.2 K/UL — HIGH (ref 3.8–10.5)
WBC UR QL: 14 /HPF — HIGH (ref 0–5)
WBC UR QL: 14 /HPF — HIGH (ref 0–5)

## 2024-01-10 PROCEDURE — 0502F SUBSEQUENT PRENATAL CARE: CPT

## 2024-01-10 PROCEDURE — 76818 FETAL BIOPHYS PROFILE W/NST: CPT

## 2024-01-10 RX ORDER — OXYTOCIN 10 UNIT/ML
333.33 VIAL (ML) INJECTION
Qty: 20 | Refills: 0 | Status: COMPLETED | OUTPATIENT
Start: 2024-01-10 | End: 2024-01-10

## 2024-01-10 RX ORDER — SODIUM CHLORIDE 9 MG/ML
1000 INJECTION, SOLUTION INTRAVENOUS
Refills: 0 | Status: DISCONTINUED | OUTPATIENT
Start: 2024-01-10 | End: 2024-01-11

## 2024-01-10 RX ORDER — CHLORHEXIDINE GLUCONATE 213 G/1000ML
1 SOLUTION TOPICAL DAILY
Refills: 0 | Status: DISCONTINUED | OUTPATIENT
Start: 2024-01-10 | End: 2024-01-11

## 2024-01-10 RX ORDER — CITRIC ACID/SODIUM CITRATE 300-500 MG
15 SOLUTION, ORAL ORAL EVERY 6 HOURS
Refills: 0 | Status: DISCONTINUED | OUTPATIENT
Start: 2024-01-10 | End: 2024-01-11

## 2024-01-10 RX ORDER — LEVOTHYROXINE SODIUM 125 MCG
75 TABLET ORAL DAILY
Refills: 0 | Status: DISCONTINUED | OUTPATIENT
Start: 2024-01-10 | End: 2024-01-12

## 2024-01-10 RX ORDER — ACETAMINOPHEN 500 MG
975 TABLET ORAL ONCE
Refills: 0 | Status: COMPLETED | OUTPATIENT
Start: 2024-01-10 | End: 2024-01-10

## 2024-01-10 RX ADMIN — SODIUM CHLORIDE 125 MILLILITER(S): 9 INJECTION, SOLUTION INTRAVENOUS at 18:24

## 2024-01-10 RX ADMIN — SODIUM CHLORIDE 125 MILLILITER(S): 9 INJECTION, SOLUTION INTRAVENOUS at 17:46

## 2024-01-10 RX ADMIN — Medication 975 MILLIGRAM(S): at 20:29

## 2024-01-10 NOTE — OB PROVIDER H&P - NSHPPHYSICALEXAM_GEN_ALL_CORE
Vital Signs Last 24 Hrs  T(C): 37.0 (10 Abdirashid 2024 16:07), Max: 37 (10 Abdirashid 2024 15:56)  T(F): 98.6 (10 Abdirashid 2024 16:07), Max: 98.6 (10 Abdirashid 2024 15:56)  HR: 97 (10 Abdirashid 2024 16:46) (97 - 120)  BP: 140/86 (10 Abdirashid 2024 16:46) (140/86 - 150/93)  BP(mean): --  RR: 18 (10 Abdirashid 2024 16:07) (18 - 18)  SpO2: 97% (10 Abdirashid 2024 16:45) (95% - 97%)    Parameters below as of 10 Abdirashid 2024 15:56  Patient On (Oxygen Delivery Method): room air        SVE: 1/0/-3  FHT: Baseline: 150 , moderate variability, + accels, - decels  [place]: no ctx  Sono: Vertex Vital Signs Last 24 Hrs  T(C): 37.0 (10 Abdirashid 2024 16:07), Max: 37 (10 Abdirashid 2024 15:56)  T(F): 98.6 (10 Abdirashid 2024 16:07), Max: 98.6 (10 Abdirashid 2024 15:56)  HR: 97 (10 Abdirashid 2024 16:46) (97 - 120)  BP: 140/86 (10 Abdirashid 2024 16:46) (140/86 - 150/93)  BP(mean): --  RR: 18 (10 Abdirashid 2024 16:07) (18 - 18)  SpO2: 97% (10 Abdirashid 2024 16:45) (95% - 97%)    Parameters below as of 10 Abdirashid 2024 15:56  Patient On (Oxygen Delivery Method): room air        SVE: 1/0/-3  FHT: Baseline: 150 , moderate variability, + accels, - decels  [place]: no ctx  Sono: Vertex

## 2024-01-10 NOTE — OB RN PATIENT PROFILE - FALL HARM RISK - UNIVERSAL INTERVENTIONS
Bed in lowest position, wheels locked, appropriate side rails in place/Call bell, personal items and telephone in reach/Instruct patient to call for assistance before getting out of bed or chair/Non-slip footwear when patient is out of bed/[place] to call system/Physically safe environment - no spills, clutter or unnecessary equipment/Purposeful Proactive Rounding/Room/bathroom lighting operational, light cord in reach Bed in lowest position, wheels locked, appropriate side rails in place/Call bell, personal items and telephone in reach/Instruct patient to call for assistance before getting out of bed or chair/Non-slip footwear when patient is out of bed/[place] to call system/Physically safe environment - no spills, clutter or unnecessary equipment/Purposeful Proactive Rounding/Room/bathroom lighting operational, light cord in reach

## 2024-01-10 NOTE — OB RN PATIENT PROFILE - FALL HARM RISK - CONCLUSION
Klisyri Counseling:  I discussed with the patient the risks of Klisyri including but not limited to erythema, scaling, itching, weeping, crusting, and pain. Universal Safety Interventions

## 2024-01-10 NOTE — OB PROVIDER H&P - HISTORY OF PRESENT ILLNESS
HPI: Pt is a 38y   @37w1d presenting for IOL for gHTN. Pt reports elevated bps in office today and one week prior. She denies headache, changes in vision, shortness of breath, chest pain, or epigastric/RUQ pain. PNC otherwise uncomplicated.  FM (+)  LOF (-)  CTX (-)  VB (-)  GBS neg  EFW: 2700g by 36w sono    OBHx:   6#15, 202 ectopic preg s/p LSC RS  GynHx: Denies uterine polyps, ovarian cysts, no abnml paps or STI/STDs. Reports one small fibroid  PMHx: thalassemia minor, hypothyroidism  PSHx: LSC RS  Med: PNV, levothyroxine  All: NKDA  Psych: Denies hx of mental health issues  SH: Denies hx of smoking, drinking, or drug usage during the pregnancy

## 2024-01-10 NOTE — OB PROVIDER H&P - ASSESSMENT
A/P: Pt is a 38y  @37w1d who presents for IOL for gHTN    1. Admit to LND. Routine Labs. IVF  2. IOL  3. Fetus: Vertex, Reactive/CEFM  4. GBS neg  5. Pain: IV pain meds/epidural PRN  6. HELLP labs     Discussed with Dr. Binh Franklin MD, PGY-1  Obstetrics and Gynecology

## 2024-01-10 NOTE — OB PROVIDER H&P - NSICDXPASTMEDICALHX_GEN_ALL_CORE_FT
To get better and follow your care plan as instructed. PAST MEDICAL HISTORY:  Bilobed placenta     History of ectopic pregnancy     Hypothyroidism      (normal spontaneous vaginal delivery)     Thalassemia minor

## 2024-01-10 NOTE — OB PROVIDER H&P - ATTENDING COMMENTS
Pt was seen in office today with persistently elevated BP. Pt presented to L and D and again BP was consistently elevated. Pt as well had elevated BP one week ago. Pt meets the criteria for gest HTN. Discussed the scenerio in detail with pt. Given Pt is beyond 37 weeks would proceed with delivery. Agree with residents assessment and plan . Will start cytotec given exam and would place a cervical balloon in a few hours and will provide epidural as need . R/B/A of the above discussed in detail with pt. Of note EFW has declined on sono over the past few weeks which may be a sign of increasing maternal BP.

## 2024-01-10 NOTE — OB RN PATIENT PROFILE - NS_OBGYNHISTORY_OBGYN_ALL_OB_FT
OBHx:  40.4 weeks girl 5lbs 15 oz  (IUI pregnancy); Current pregnancy spontaneous pregnancy  ,  ectopic preg s/p LSC RS    GynHx: Denies uterine polyps, ovarian cysts, no abnml paps or STI/STDs. Reports one small fibroid

## 2024-01-10 NOTE — OB PROVIDER H&P - NS_OBGYNHISTORY_OBGYN_ALL_OB_FT
OBHx:   6#152022 ectopic preg s/p LSC RS  GynHx: Denies uterine polyps, ovarian cysts, no abnml paps or STI/STDs. Reports one small fibroid

## 2024-01-10 NOTE — OB RN TRIAGE NOTE - NS_DISPOSITION_OBGYN_ALL_OB
OUTPATIENT PROGRESS NOTE    DATE OF SERVICE:  04/11/17    PROBLEM LIST:   1. Ankle fracture  2. Malignant neoplasm - colon, S/p partial colectomy   3. S/p 12 cycles of treatment with Folfox-6 chemotherapy completing his last cycle on 06/13/16.  4. PET scan done 3/23/17 with evidence of ciara recurrence. Initiated FOLFIRI + Avastin 3/29/17.     CHIEF COMPLAINT: Follow up for colon cancer and possible continuation of treatment.     SYMPTOMS:  Patient arrives to the clinic for possible treatment. He reports increased fatigue following his latest treatment however this has since improved. He reports no abdominal or back pain. His bowel movements have been good, if slightly constipated. He is maintaining good hydration. He denies fever or chills. He denies any bleeding events. He denies headache. His rhinorrhea has improved with slight residual clear/yellow colored mucus. He denies cough. He has been avoiding simple sugars in his diet.     REVIEW OF SYSTEMS:   Detailed 10 point review of systems was performed which was negative other than the pertinent positives as discussed above.    ECOG=0    ALLERGIES:  No Known Allergies    Current Outpatient Prescriptions   Medication Sig Dispense Refill   • prochlorperazine (COMPAZINE) 10 MG tablet Take 1 tablet by mouth every 6 hours as needed for Nausea or Vomiting (related to chemotherapy). 30 tablet 3   • Cholecalciferol (VITAMIN D3) 5000 UNITS capsule Take by mouth daily.     • Multiple Vitamin (MULTI VITAMIN DAILY) Tab Take 1 tablet by mouth daily.     • ondansetron (ZOFRAN ODT) 8 MG disintegrating tablet Take 1 tablet by mouth every 8 hours as needed for Nausea (nausea and vomiting related to chemotherapy). 30 tablet 3   • loperamide (IMODIUM) 2 MG capsule Take 1 capsule by mouth 4 times daily as needed for Diarrhea (prn diarrhea). 30 capsule 0     No current facility-administered medications for this visit.      PHYSICAL EXAM:  GENERAL: The patient is sitting in the chair  in no apparent distress.   Vitals:    04/11/17 0931   BP: 130/65   Pulse: 80   Resp: 16   Temp: 98 °F (36.7 °C)   TempSrc: Oral   SpO2: 94%   Weight: 96.3 kg   Height: 5' 9\" (1.753 m)     EYES: ABRAHAN, EOMI. Conjunctival pallor is not present. Sclerae: No icterus.   ENT: No hearing deficit. Tongue: No masses. Uvula in midline. Tonsils: Unremarkable.   NECK: No thyromegaly.   LYMPHATIC: No lymphadenopathy in neck, axillary or inguinal areas.   PULMONARY: Expiratory wheezing previously noted has improved. No dullness on percussion.   CARDIOVASCULAR: S1, S2 heard, regular rate and rhythm. No mechanical sounds or murmurs noted. Apical pulse normal.   ABDOMEN: Surgical site has healed well. No masses are palpable. No ascites is noted. BS- Are 2-3 /mt.  EXTREMITIES: No edema.  SKIN: No ulceration. No unusual masses. No unusual pigmentation.   CNS: Alert and oriented x 3. Grossly non-focal.  PSYCHIATRIC: Normal affect.    LABORATORY DATA:  Reviewed and confirmed in the EMR.    ASSESSMENT AND PLAN:  1. Stage III (R5P1wV0) sigmoid colon poorly differentiated adenocarcinoma. PET scan of 3/23/17 showed recurrent disease in an isolated LN in the mesenteric root.  Is KRAS WT. Patient initiated cycle 1 day 1 of FOLFIRI + Avastin on 3/29/17 with plan for 6 cycles followed by restaging. If pt has no evidence of new lesions, he will be taken for surgical resection of the ciara mass. This would be followed by additional 3 months of FOLFIRI + Avastin. SBRT could be considered if surgery is not feasible.  Patient has tolerated cycle 1 well.   Pt advised to proceed with Cycle 2/Day 1 of FOLFIRI + Avastin today. I will see him again in 2 weeks. He is encouraged to call with any signs or symptoms of infection including fever greater than 100.4F.   2. Family history of colon cancer. Genetic counseling was completed. He is encouraged to have screening colonoscopies, next due in June 2017.     The above was discussed with the patient at  length. He exhibits complete understanding of the above discussion and is agreeable to proceed with the plan of care.    Neymar Malone MD      This chart was documented by Tariq Walden, acting as a scribe for Neymar Malone MD. 4/11/2017, 9:43 AM.     The documentation recorded by the scribe accurately and completely reflects the service(s) I personally performed and the decisions made by me.   Neymar Malone MD     Continue to Observe

## 2024-01-10 NOTE — OB RN TRIAGE NOTE - FALL HARM RISK - UNIVERSAL INTERVENTIONS
Bed in lowest position, wheels locked, appropriate side rails in place/Call bell, personal items and telephone in reach/Instruct patient to call for assistance before getting out of bed or chair/Non-slip footwear when patient is out of bed/[place] to call system/Physically safe environment - no spills, clutter or unnecessary equipment/Purposeful Proactive Rounding/Room/bathroom lighting operational, light cord in reach Bed in lowest position, wheels locked, appropriate side rails in place/Call bell, personal items and telephone in reach/Instruct patient to call for assistance before getting out of bed or chair/Non-slip footwear when patient is out of bed/[place] to call system/Physically safe environment - no spills, clutter or unnecessary equipment/Purposeful Proactive Rounding/Room/bathroom lighting operational, light cord in reach

## 2024-01-10 NOTE — OB RN TRIAGE NOTE - NSICDXPASTMEDICALHX_GEN_ALL_CORE_FT
PAST MEDICAL HISTORY:  Bilobed placenta     History of ectopic pregnancy     Hypothyroidism      (normal spontaneous vaginal delivery)     Thalassemia minor

## 2024-01-10 NOTE — OB RN PATIENT PROFILE - BLOOD TRANSFUSION, PREVIOUS, PROFILE
November 22, 2022      To Whom It May Concern:      Farrah Champagne was seen in our Emergency Department today, 11/22/22.  I expect her condition to improve over the next few days.  She may return to work/school when improved.    Sincerely,        Lei Hodges MD         no

## 2024-01-11 LAB
T PALLIDUM AB TITR SER: NEGATIVE — SIGNIFICANT CHANGE UP

## 2024-01-11 PROCEDURE — 88307 TISSUE EXAM BY PATHOLOGIST: CPT | Mod: 26

## 2024-01-11 PROCEDURE — 59410 OBSTETRICAL CARE: CPT

## 2024-01-11 RX ORDER — PRAMOXINE HYDROCHLORIDE 150 MG/15G
1 AEROSOL, FOAM RECTAL EVERY 4 HOURS
Refills: 0 | Status: DISCONTINUED | OUTPATIENT
Start: 2024-01-11 | End: 2024-01-12

## 2024-01-11 RX ORDER — TETANUS TOXOID, REDUCED DIPHTHERIA TOXOID AND ACELLULAR PERTUSSIS VACCINE, ADSORBED 5; 2.5; 8; 8; 2.5 [IU]/.5ML; [IU]/.5ML; UG/.5ML; UG/.5ML; UG/.5ML
0.5 SUSPENSION INTRAMUSCULAR ONCE
Refills: 0 | Status: DISCONTINUED | OUTPATIENT
Start: 2024-01-11 | End: 2024-01-12

## 2024-01-11 RX ORDER — SIMETHICONE 80 MG/1
80 TABLET, CHEWABLE ORAL EVERY 4 HOURS
Refills: 0 | Status: DISCONTINUED | OUTPATIENT
Start: 2024-01-11 | End: 2024-01-12

## 2024-01-11 RX ORDER — IBUPROFEN 200 MG
600 TABLET ORAL EVERY 6 HOURS
Refills: 0 | Status: DISCONTINUED | OUTPATIENT
Start: 2024-01-11 | End: 2024-01-12

## 2024-01-11 RX ORDER — BENZOCAINE 10 %
1 GEL (GRAM) MUCOUS MEMBRANE EVERY 6 HOURS
Refills: 0 | Status: DISCONTINUED | OUTPATIENT
Start: 2024-01-11 | End: 2024-01-12

## 2024-01-11 RX ORDER — IBUPROFEN 200 MG
600 TABLET ORAL EVERY 6 HOURS
Refills: 0 | Status: COMPLETED | OUTPATIENT
Start: 2024-01-11 | End: 2024-12-09

## 2024-01-11 RX ORDER — OXYCODONE HYDROCHLORIDE 5 MG/1
5 TABLET ORAL
Refills: 0 | Status: DISCONTINUED | OUTPATIENT
Start: 2024-01-11 | End: 2024-01-12

## 2024-01-11 RX ORDER — OXYTOCIN 10 UNIT/ML
4 VIAL (ML) INJECTION
Qty: 30 | Refills: 0 | Status: DISCONTINUED | OUTPATIENT
Start: 2024-01-11 | End: 2024-01-11

## 2024-01-11 RX ORDER — ONDANSETRON 8 MG/1
4 TABLET, FILM COATED ORAL ONCE
Refills: 0 | Status: COMPLETED | OUTPATIENT
Start: 2024-01-11 | End: 2024-01-11

## 2024-01-11 RX ORDER — DIBUCAINE 1 %
1 OINTMENT (GRAM) RECTAL EVERY 6 HOURS
Refills: 0 | Status: DISCONTINUED | OUTPATIENT
Start: 2024-01-11 | End: 2024-01-12

## 2024-01-11 RX ORDER — SODIUM CHLORIDE 9 MG/ML
3 INJECTION INTRAMUSCULAR; INTRAVENOUS; SUBCUTANEOUS EVERY 8 HOURS
Refills: 0 | Status: DISCONTINUED | OUTPATIENT
Start: 2024-01-11 | End: 2024-01-12

## 2024-01-11 RX ORDER — OXYCODONE HYDROCHLORIDE 5 MG/1
5 TABLET ORAL ONCE
Refills: 0 | Status: DISCONTINUED | OUTPATIENT
Start: 2024-01-11 | End: 2024-01-12

## 2024-01-11 RX ORDER — MAGNESIUM HYDROXIDE 400 MG/1
30 TABLET, CHEWABLE ORAL
Refills: 0 | Status: DISCONTINUED | OUTPATIENT
Start: 2024-01-11 | End: 2024-01-12

## 2024-01-11 RX ORDER — KETOROLAC TROMETHAMINE 30 MG/ML
30 SYRINGE (ML) INJECTION ONCE
Refills: 0 | Status: DISCONTINUED | OUTPATIENT
Start: 2024-01-11 | End: 2024-01-11

## 2024-01-11 RX ORDER — ACETAMINOPHEN 500 MG
975 TABLET ORAL
Refills: 0 | Status: DISCONTINUED | OUTPATIENT
Start: 2024-01-11 | End: 2024-01-12

## 2024-01-11 RX ORDER — HYDROCORTISONE 1 %
1 OINTMENT (GRAM) TOPICAL EVERY 6 HOURS
Refills: 0 | Status: DISCONTINUED | OUTPATIENT
Start: 2024-01-11 | End: 2024-01-12

## 2024-01-11 RX ORDER — LANOLIN
1 OINTMENT (GRAM) TOPICAL EVERY 6 HOURS
Refills: 0 | Status: DISCONTINUED | OUTPATIENT
Start: 2024-01-11 | End: 2024-01-12

## 2024-01-11 RX ORDER — OXYTOCIN 10 UNIT/ML
41.67 VIAL (ML) INJECTION
Qty: 20 | Refills: 0 | Status: DISCONTINUED | OUTPATIENT
Start: 2024-01-11 | End: 2024-01-12

## 2024-01-11 RX ORDER — AER TRAVELER 0.5 G/1
1 SOLUTION RECTAL; TOPICAL EVERY 4 HOURS
Refills: 0 | Status: DISCONTINUED | OUTPATIENT
Start: 2024-01-11 | End: 2024-01-12

## 2024-01-11 RX ORDER — DIPHENHYDRAMINE HCL 50 MG
25 CAPSULE ORAL EVERY 6 HOURS
Refills: 0 | Status: DISCONTINUED | OUTPATIENT
Start: 2024-01-11 | End: 2024-01-12

## 2024-01-11 RX ADMIN — Medication 975 MILLIGRAM(S): at 14:13

## 2024-01-11 RX ADMIN — OXYCODONE HYDROCHLORIDE 5 MILLIGRAM(S): 5 TABLET ORAL at 18:47

## 2024-01-11 RX ADMIN — OXYCODONE HYDROCHLORIDE 5 MILLIGRAM(S): 5 TABLET ORAL at 18:06

## 2024-01-11 RX ADMIN — Medication 1000 MILLIUNIT(S)/MIN: at 10:10

## 2024-01-11 RX ADMIN — Medication 975 MILLIGRAM(S): at 21:30

## 2024-01-11 RX ADMIN — OXYCODONE HYDROCHLORIDE 5 MILLIGRAM(S): 5 TABLET ORAL at 21:30

## 2024-01-11 RX ADMIN — OXYCODONE HYDROCHLORIDE 5 MILLIGRAM(S): 5 TABLET ORAL at 20:37

## 2024-01-11 RX ADMIN — Medication 975 MILLIGRAM(S): at 20:38

## 2024-01-11 RX ADMIN — Medication 4 MILLIUNIT(S)/MIN: at 08:46

## 2024-01-11 RX ADMIN — ONDANSETRON 4 MILLIGRAM(S): 8 TABLET, FILM COATED ORAL at 07:32

## 2024-01-11 RX ADMIN — Medication 75 MICROGRAM(S): at 08:56

## 2024-01-11 RX ADMIN — SODIUM CHLORIDE 125 MILLILITER(S): 9 INJECTION, SOLUTION INTRAVENOUS at 09:23

## 2024-01-11 RX ADMIN — Medication 600 MILLIGRAM(S): at 15:35

## 2024-01-11 RX ADMIN — Medication 30 MILLIGRAM(S): at 10:30

## 2024-01-11 NOTE — OB PROVIDER DELIVERY SUMMARY - NSPROVIDERDELIVERYNOTE_OBGYN_ALL_OB_FT
Viable female infant in FAMILIA position.  Delivered easily, no nuchal cord.  Shoulders and body delivered easily. After 60 second delay, cord was clamped x 2 and cut.  Baby handed to awaiting mother. Apgars 9,9. Placenta delivered spontaneously and intact. Bi lobed placenta noted and sent to pathology. Cord gasses collected. cervix and sulci noted to be intact. 1st degree laceration repaired with 3.0 vicryl Rapid. Excellent hemostasis noted. rectal exam wnl.   mL. Mother and baby to recover in delivery room in stable condition.

## 2024-01-11 NOTE — OB PROVIDER LABOR PROGRESS NOTE - NS_SUBJECTIVE/OBJECTIVE_OBGYN_ALL_OB_FT
Pt seen for placement of cervical suero techniques; 60mL instilled into uterine/vaginal balloons; placed w/o incidence; pt tolerated procedure well
Patient seen and examined for cervical change, resting comfortably with epidural; CB removed with ease by RN    Vital Signs Last 24 Hrs  T(C): 37.2 (11 Jan 2024 07:57), Max: 37.2 (11 Jan 2024 07:57)  T(F): 98.96 (11 Jan 2024 07:57), Max: 98.96 (11 Jan 2024 07:57)  HR: 89 (11 Jan 2024 08:49) (46 - 120)  BP: 136/81 (11 Jan 2024 08:43) (91/55 - 151/89)  BP(mean): --  RR: 14 (11 Jan 2024 07:57) (14 - 18)  SpO2: 93% (11 Jan 2024 08:49) (83% - 100%)    Parameters below as of 10 Abdirashid 2024 17:53  Patient On (Oxygen Delivery Method): room air

## 2024-01-11 NOTE — OB PROVIDER LABOR PROGRESS NOTE - NS_ADDCERVICALEXAM_OBGYN_ALL_OB
Physical Therapy Daily Progress Note      Visit # : 4    Missy Mcgraw reports 0/10 pain today at rest.  Pt reports her soreness after PT is getting much better.         Objective Pt presents to PT today with no distress noted.     Pt with L hip greater trochanter primary area of pain.     PROM is still guarded at end range of motion.     Stair climbing is limited due to pain in the hip and thigh.       See Exercise, Manual, and Modality Logs for complete treatment.     Assessment/Plan  Pt with slow improvements.  Pt still has some functional deficits in stair climbing.       Progress per Plan of Care  Re-assess in 1.5 weeks after she returns from vacation.            Manual Therapy:    11     mins  05795;  Therapeutic Exercise:    27     mins  06694;     Neuromuscular Susi:        mins  07881;    Therapeutic Activity:          mins  80672;     Gait Training:        ___  mins  53180;     Ultrasound:          mins  03152;    Electrical Stimulation:         mins  05863 ( );  Dry Needling          mins self-pay    Timed Treatment:   38   mins   Total Treatment:     56   mins    Michel Sawant, PT  Physical Therapist        
Click to add…
Click to add…

## 2024-01-11 NOTE — OB PROVIDER LABOR PROGRESS NOTE - ASSESSMENT
38 year old  at 37.2 weeks PEC IOL s/p CB/BC now on pitocin 4mu/min    -Repositioned to high fowlers for fetal head descent  -Continue Pitocin  -4AROM at next VE    d/w MD Zoila Traylor FNP-BC
A/P:  -EFM: resuscitative measures prn  -continue buccal cytotec  -augment with Pitocin  -anesthesia reinforcement prn  -anticipate

## 2024-01-11 NOTE — OB PROVIDER DELIVERY SUMMARY - NSSELHIDDEN_OBGYN_ALL_OB_FT
[NS_DeliveryAttending1_OBGYN_ALL_OB_FT:[id number]==] [NS_DeliveryAttending1_OBGYN_ALL_OB_FT:[id number]==]

## 2024-01-11 NOTE — OB RN DELIVERY SUMMARY - NSSELHIDDEN_OBGYN_ALL_OB_FT
[NS_DeliveryAttending1_OBGYN_ALL_OB_FT:[id number]==],[NS_DeliveryRN_OBGYN_ALL_OB_FT:[id number]==] [NS_DeliveryAttending1_OBGYN_ALL_OB_FT:[id number]==],[NS_DeliveryRN_OBGYN_ALL_OB_FT:[id number]==]

## 2024-01-11 NOTE — OB PROVIDER DELIVERY SUMMARY - NSLOWPPHRISK_OBGYN_A_OB
No previous uterine incision/Carpio Pregnancy/Less than or equal to 4 previous vaginal births/No known bleeding disorder/No history of postpartum hemorrhage

## 2024-01-12 ENCOUNTER — TRANSCRIPTION ENCOUNTER (OUTPATIENT)
Age: 39
End: 2024-01-12

## 2024-01-12 VITALS
RESPIRATION RATE: 18 BRPM | TEMPERATURE: 98 F | WEIGHT: 151.9 LBS | HEIGHT: 67 IN | SYSTOLIC BLOOD PRESSURE: 124 MMHG | HEART RATE: 77 BPM | DIASTOLIC BLOOD PRESSURE: 86 MMHG | OXYGEN SATURATION: 98 %

## 2024-01-12 DIAGNOSIS — O14.93 UNSPECIFIED PRE-ECLAMPSIA, THIRD TRIMESTER: ICD-10-CM

## 2024-01-12 PROCEDURE — 59050 FETAL MONITOR W/REPORT: CPT

## 2024-01-12 PROCEDURE — 86850 RBC ANTIBODY SCREEN: CPT

## 2024-01-12 PROCEDURE — 80053 COMPREHEN METABOLIC PANEL: CPT

## 2024-01-12 PROCEDURE — 86900 BLOOD TYPING SEROLOGIC ABO: CPT

## 2024-01-12 PROCEDURE — 84550 ASSAY OF BLOOD/URIC ACID: CPT

## 2024-01-12 PROCEDURE — 81001 URINALYSIS AUTO W/SCOPE: CPT

## 2024-01-12 PROCEDURE — 85384 FIBRINOGEN ACTIVITY: CPT

## 2024-01-12 PROCEDURE — 85025 COMPLETE CBC W/AUTO DIFF WBC: CPT

## 2024-01-12 PROCEDURE — 86901 BLOOD TYPING SEROLOGIC RH(D): CPT

## 2024-01-12 PROCEDURE — 85730 THROMBOPLASTIN TIME PARTIAL: CPT

## 2024-01-12 PROCEDURE — 88307 TISSUE EXAM BY PATHOLOGIST: CPT

## 2024-01-12 PROCEDURE — 84156 ASSAY OF PROTEIN URINE: CPT

## 2024-01-12 PROCEDURE — 86780 TREPONEMA PALLIDUM: CPT

## 2024-01-12 PROCEDURE — 83615 LACTATE (LD) (LDH) ENZYME: CPT

## 2024-01-12 PROCEDURE — 82570 ASSAY OF URINE CREATININE: CPT

## 2024-01-12 PROCEDURE — 85610 PROTHROMBIN TIME: CPT

## 2024-01-12 RX ADMIN — OXYCODONE HYDROCHLORIDE 5 MILLIGRAM(S): 5 TABLET ORAL at 06:45

## 2024-01-12 RX ADMIN — Medication 600 MILLIGRAM(S): at 06:02

## 2024-01-12 RX ADMIN — OXYCODONE HYDROCHLORIDE 5 MILLIGRAM(S): 5 TABLET ORAL at 11:21

## 2024-01-12 RX ADMIN — Medication 600 MILLIGRAM(S): at 00:09

## 2024-01-12 RX ADMIN — OXYCODONE HYDROCHLORIDE 5 MILLIGRAM(S): 5 TABLET ORAL at 00:09

## 2024-01-12 RX ADMIN — Medication 975 MILLIGRAM(S): at 09:01

## 2024-01-12 RX ADMIN — Medication 1 TABLET(S): at 11:18

## 2024-01-12 RX ADMIN — Medication 600 MILLIGRAM(S): at 11:48

## 2024-01-12 RX ADMIN — Medication 975 MILLIGRAM(S): at 04:25

## 2024-01-12 RX ADMIN — Medication 975 MILLIGRAM(S): at 03:25

## 2024-01-12 RX ADMIN — Medication 975 MILLIGRAM(S): at 09:35

## 2024-01-12 RX ADMIN — OXYCODONE HYDROCHLORIDE 5 MILLIGRAM(S): 5 TABLET ORAL at 06:02

## 2024-01-12 RX ADMIN — Medication 600 MILLIGRAM(S): at 06:45

## 2024-01-12 RX ADMIN — Medication 600 MILLIGRAM(S): at 11:18

## 2024-01-12 RX ADMIN — Medication 75 MICROGRAM(S): at 06:03

## 2024-01-12 RX ADMIN — OXYCODONE HYDROCHLORIDE 5 MILLIGRAM(S): 5 TABLET ORAL at 03:25

## 2024-01-12 RX ADMIN — OXYCODONE HYDROCHLORIDE 5 MILLIGRAM(S): 5 TABLET ORAL at 01:00

## 2024-01-12 RX ADMIN — Medication 600 MILLIGRAM(S): at 01:00

## 2024-01-12 RX ADMIN — OXYCODONE HYDROCHLORIDE 5 MILLIGRAM(S): 5 TABLET ORAL at 04:25

## 2024-01-12 NOTE — PRE-ANESTHESIA EVALUATION ADULT - NSANTHADDINFOFT_GEN_ALL_CORE
PDPH primarily neck pain, HA when upright, improved when supine. S/p epidural blood patch w/ improvement of symptoms but return and worsening of symptoms in PM. No blurry vision, photophobia, numbness, weakness, tingling. Informed consent obtained for repeat epidural blood patch.

## 2024-01-12 NOTE — PROGRESS NOTE ADULT - PROBLEM SELECTOR PLAN 2
Blood pressures normal to mild range.  No signs/symptoms of severe features.  No need for antiHTN or Mgs04 at this time.  Cont to monitor BP's.
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
## 2024-01-12 NOTE — DISCHARGE NOTE OB - PATIENT PORTAL LINK FT
You can access the FollowMyHealth Patient Portal offered by [facility] by registering at the following website: [email or web address]. By joining [employer]’s FollowMyHealth portal, you will also be able to view your health information using other applications (apps) compatible with our system. You can access the FollowMyHealth Patient Portal offered by [facility] by registering at the following website: [email or web address]. By joining [employer]’s FollowMyHealth portal, you will also be able to view your health information using other applications (apps) compatible with our system.

## 2024-01-12 NOTE — DISCHARGE NOTE OB - PHYSICIAN SECTION COMPLETE
This visit is being performed virtually via Video visit using [employer].   Clinical Location: [facility]  Therese's location Home and is physically present in   the [facility] at the time of this visit.       [facility] [place] 2[place] Cardiovascular [place]  [facility]  Cardio-Oncology Follow-up      Date of Visit:  10/23/2023  Patient Name:  Therese Grace  Medical Record Number:  [id number]  YOB: 1956   Primary Physician:  Nereyda Levi MD  Referring Oncologist: Dr. Shea    Reason For Visit:  Chief Complaint   Patient presents with   • Cardio-Oncology Follow-up   • Video Visit       Therese Grace is a 67 year old female who presents to the clinic with the following CV (cardiovascular) history and/or risk factors:    1. Hypertension  2. Hyperlipidemia  3. Impaired fasting glucose  4. History of palpitations, questionable for paroxysmal SVT  5. History of Graves disease status post ablation  6. GERD  7. Obesity  8. Anxiety    Historical Episodes:  6/29/2023 ED visit for dizziness and palpitations - received IVfluids and anti-anxiety medication. 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    Chemotherapy Treatment History:  DOSE DENSE AC- WEEKLY T: DOXORUBICIN 60 MG/M2 + CYCLOPHOSPHAMIDE 600 MG/M2 EVERY 14 DAYS X 4 CYCLES, FOLLOWED BY PACLITAXEL 80 MG/M2 WEEKLY X 12 DOSES     Doxorubicin   8/8/2023- 9/27/2023  Cyclophosphamide   8/8/2023- 9/27/2023  Paclitaxel    10/11/2023-current    Lifetime Dose Tracking   • DOXOrubicin: 239.372 mg/m2 (456 mg) = 53.19 % of the maximum lifetime dose of 450 mg/m2        Doxorubicin: Heart failure (HF)/Left ventricular (LV) dysfunction: 3-26% (incidence increases with  higher lifetime cumulative dosing and concurrent cardiotoxic chemotherapy)  Cyclophosphamide: HF/LV dysfunction (7-28%), reports of arrhythmias (atrial and ventricular), myocarditis, pericardial effusion and cardiac tamponade.  Increased incidence with high dose, elderly patients, and those exposed to anthracyclines or radiation.     Please note, the documented incidence of cyclophosphamide-induced cardiotoxicities, such as cardiac arrhythmias, cardiac failure, and hemorrhagic myocarditis are <1% per post-marketing/case report data and is associated with high dose regimens typically used in hematopoietic stem cell transplantation.    Radiation Treatment History:  Pending    Cardiac Toxicity Risk Assessment  Anthracycline CV Toxicity Risk Factor Assessment  Anthracycline CV Toxicity Risk Factor(s) Present: Age 65-79 years (medium - 2 points), Hypertension (BP > 140/90 or on treatment) (medium - 1 point), Current smoker or significant smoking history (medium - 1 point) and Obesity (BMI > 30kg/m2) (medium - 1 point)     Patient's Risk Category: High Risk                  Anthracycline Surveillance Monitoring Guide       Baseline C1 C2 C3 C4 C5 C6 3M Post 12M Post   High and Very High Risk ECG x                    TTE x   x   x   x x x    NTproBNP/  Troponin x x x x x x x x x      Key  x - is recommended or indicated  xa - should be considered  xb - may be considered     Reference  2022 ESC Guidelines on Cardio-Oncology Developed in Collaboration With the  Hematology Association (EHA), the European Society for Therapeutic Radiology and Oncology (ESTRO) and the International Cardio-Oncology Society (IC-OS). Eur Heart J 2022;Aug 26      Current cardiac medications include:   Warfarin    Presents today for Cardio Oncology virtual follow-up.  She is unaccompanied.  Overall, she has continued to feel well.  She has experienced occasional palpitations for many years in relation to her Graves disease.  About 3 weeks  ago she had another episode of palpitations lasting a few hours without additional associated symptoms.  When she has palpitations they usually occur for 1-3 hours.  She denies chest pain, dizziness, lightheadedness, orthopnea, PND, lower extremity edema or abdominal bloating.  She denies breathlessness with routine activities like walking but does become short of breath with climbing up a flight of stairs.      MEDICATIONS:  Current Outpatient Medications   Medication Sig Dispense Refill   • warfarin (COUMADIN) 5 MG tablet Take 1 tablet by mouth 4 days a week AND 0.5 tablets 3 days a week. On Tuesdays, Thursdays and Saturdays. Adjust as directed by [facility] clinic 71 tablet 0   • levothyroxine 88 MCG tablet Take 1 tablet by mouth daily. 90 tablet 0   • clotrimazole-betamethasone (LOTRISONE) 1-0.05 % cream Apply 1 application. topically in the morning and 1 application. in the evening. 30 g 0   • dexAMETHasone (DECADRON) 4 MG tablet Take 2 tablets by mouth daily (with breakfast). Start the day after chemotherapy for 2 days. 4 tablet 2   • loratadine (CLARITIN) 10 MG tablet TAKE ONE TABLET ONE TABLET ONCE DAILY AS NEEDED (AS DIRECTED BY ONCOLOGIST) 30 tablet 0   • atorvastatin (LIPITOR) 20 MG tablet Take 1 tablet by mouth daily. 90 tablet 1   • prochlorperazine (COMPAZINE) 10 MG tablet Take 1 tablet by mouth every 6 hours as needed for Nausea or Vomiting. 30 tablet 5   • sertraline (ZOLOFT) 25 MG tablet Take 1 tablet by mouth daily. 90 tablet 3   • clonazePAM (KlonoPIN) 0.5 MG tablet Take 1 tablet by mouth daily as needed for Anxiety. Use sparingly 15 tablet 0   • Cholecalciferol (VITAMIN D PO) Take 4,000 Units by mouth daily.     • Cyanocobalamin (B-12) 2000 MCG Tab Take 5,000 mcg by mouth daily.       No current facility-administered medications for this visit.       PHYSICAL EXAMINATION:  Vitals:    Visit Vitals  LMP  (LMP Unknown)   No physical exam due to virtual visit      DIAGNOSTICS:  The following  diagnostics were reviewed:    Laboratory:  Recent Labs   Lab 10/18/23  1023 10/10/23  0749 09/27/23  1116 09/20/23  0802 09/13/23  0814   Sodium 138 140 139 137 141   Potassium 3.9 4.0 4.0 4.0 4.1   BUN 17 13 13 13 12   Creatinine 0.86 0.96* 0.93 0.82 0.95   Glomerular Filtration Rate 75 66 68 79 66       Recent Labs   Lab 08/22/23  0848   NT-proBNP 285*       Recent Labs   Lab 10/18/23  1023 10/10/23  0749 10/05/23  0934 09/28/23  1414 09/25/23  1010   INR 1.7 2.2 4.1 3.4 1.4       Recent Labs   Lab 10/18/23  1024 10/10/23  0749 10/04/23  1144 09/27/23  1116 09/20/23  0802   WBC 3.8* 10.7 1.5* 9.2 2.5*   RBC 2.81* 3.22* 2.88* 3.04* 2.93*   HGB 8.7* 10.0* 9.0* 9.4* 9.0*   HCT 26.6* 30.3* 27.1* 28.7* 27.3*   MCV 94.7 94.1 94.1 94.4 93.2   MCH 31.0 31.1 31.3 30.9 30.7   MCHC 32.7 33.0 33.2 32.8 33.0   RDW-CV 17.7* 17.8* 16.9* 17.3* 16.2*    217 146 241 144       Recent Labs   Lab 12/17/22  1026   Cholesterol 166   Triglycerides 99   HDL 55   Cholesterol/ HDL Ratio 3.0   CALCLDL 91   Non-HDL Cholesterol 111       Recent Labs   Lab 10/10/23  0749 06/29/23  1600 05/08/23  1524 02/14/23  1520 12/17/22  1026   TSH 14.200* 3.999 3.220 0.240* 0.240*       No results for input(s): \"HGBA1C\" in the last 8765 hours.    Cardiac:  Ejection Fraction (%)   Date Value   10/16/2023 61   08/30/2023 62   06/20/2023 64     LV End Systolic Longitudinal Strain Global (%)   Date Value   10/16/2023 -18   08/30/2023 -18     Left Ventricular Internal Dimension in Diastole (cm)   Date Value   10/16/2023 5.352   08/30/2023 4.568   06/20/2023 4.529     MV E Wave Keenan/E Tissue Keenan Med (unitless)   Date Value   06/20/2023 9.064     No results found for: \"RVSP\"    10/16/2023 Echo  Focused TTE : Cardio Oncology.  Normal left ventricular size and systolic function, EF 61 %, GLS -18.0 %.  Normal right ventricular chamber size and systolic function. RV Strain -23%.  Echogenic mass noted on previous TTE and PAULA is no longer visualized.  No  pericardial effusion.      EKG 6/29/2023  Normal sinus rhythm   Low voltage QRS   Nonspecific T wave abnormality   Abnormal ECG   When compared with ECG of 16-JUN-2023 13:19, No significant change was found   Confirmed by GURJIT VARGAS, TORY VALENTINE ([id number]) on 6/29/2023 3:56:46 PM         Echo 6/20/2023  EF 64% GLS not measured   Normal left ventricular chamber size, wall thickness and systolic function with no regional wall motion abnormalities. LV EF 64 %.  Grade I diastolic dysfunction of the left ventricle (impaired relaxation pattern).  RVSP could not be calculated due to incomplete tricuspid regurgitation velocity profile.  As compared to previous echocardiogram dated 07/15/2021 there is no significant change.     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   Perfusion status is normal.        Other pertinent diagnoses:    Right atrial mass/thrombus  - resolved on echo  - on warfarin.  - Repeat Echo in February/March 2024 and consider discontinuation of anticoagulation at that time.     Palpitations  Obtain 30 day cardiac event monitor to evaluate for arrhythmia.    History of tobacco abuse    Hypertension    Dyslipidemia  Cholesterol (mg/dL)   Date Value   12/17/2022 166     HDL (mg/dL)   Date Value   12/17/2022 55     Triglycerides (mg/dL)   Date Value   12/17/2022 99     LDL (mg/dL)   Date Value   12/17/2022 91        Impaired Fasting Glucose  Last Lab A1C:  Hemoglobin A1C (%)   Date Value   09/27/2016 5.2     Last Point of Care A1C:  No results found for: \"ASSZCMPY2D\"     Obesity   There is no height or weight on file to calculate BMI.    Recommendations:  1. Cardioprotective medical adjustment:    None    [] ACEi/ARB/Entresto    [] Beta Blocker    [x] Statin    [] Calcium Channel Blocker    [] Corlanor     [] Digoxin    [] Diuretic    [] Hydralazine    [] MRA    [] Nitrate    [] SGLT2-I     2. Lifestyle modification:  Activity goal of 150 minutes per week  3. Continue surveillance program for anthracycline based cardio-oncology protocols      Follow-up:     Clinic:  February/March 2024    Tests:  Echo, A1C    Visit coordinated by:  Irene KIM.     Patient understands she can return sooner if any issues should arise.     I spent a total of 30 minutes on the day of the visit.  This includes pre-charting, chart review, documenting and referring/communicating with other health care professionals.    KARI Wilder         Yes

## 2024-01-12 NOTE — DISCHARGE NOTE OB - NS MD DC FALL RISK RISK
For information on Fall & Injury Prevention, visit: [email or web address] OR  [email or web address] OR  https://www.cdc.gov/steadi/patient.html For information on Fall & Injury Prevention, visit: [email or web address]://[email or web address]/[email or web address]-[email or web address]-mobility OR  https://www.[facility]/[email or web address]-prevention-tips-to-avoid-injury OR  https://www.cdc.gov/steadi/patient.html

## 2024-01-12 NOTE — PROGRESS NOTE ADULT - SUBJECTIVE AND OBJECTIVE BOX
Postpartum Note- PPD#1    Prenatal Labs  Blood type: A Positive  Rubella IgG: imm  RPR: Negative    S:Patient feels well overall but reports neck strain/pain, improving from yesterday. Reports it is worsened when turning her neck.  Pt also is s/p blood patch for spinal HA. (HA currently gone.)   Pt is OOB, tolerating PO, voiding. Vaginal bleeding mild to moderate.Denies dizziness, CP, SOB, n/v, abdominal pain or calf pain.    pmhx/sur 40.4 weeks girl 5lbs 15 oz  (IUI pregnancy)    ectopic preg s/p LSC RS  O:  Vital Signs Last 24 Hrs  T(C): 36.9 (2024 05:43), Max: 37.2 (2024 07:57)  T(F): 98.5 (2024 05:43), Max: 99 (2024 10:15)  HR: 75 (2024 05:43) (74 - 196)  BP: 123/81 (2024 05:43) (119/83 - 215/129)  BP(mean): --  RR: 18 (2024 05:43) (14 - 18)  SpO2: 96% (2024 05:43) (71% - 100%)    Parameters below as of 2024 05:43  Patient On (Oxygen Delivery Method): room air         Gen: NAD.  Abdomen: Soft, nontender, non-distended, fundus firm.  Vaginal: Lochia WNL.   Ext: Neg edema, Neg calf tenderness    LABS:    Hemoglobin: 10.0 g/dL (01-10 @ 16:53)      Hematocrit: 31.7 % (01-10 @ 16:53)

## 2024-01-12 NOTE — PROGRESS NOTE ADULT - ASSESSMENT
A/P: 38y G P2 PPD # 1   S/P  c/b PEC and s/p blood patch.    A/P: 38y G P2 PPD # 1   S/P  c/b PEC and s/p blood patch.     Louisa Marsh M.D.

## 2024-01-14 LAB — B-HEM STREP SPEC QL CULT: NORMAL

## 2024-01-17 ENCOUNTER — APPOINTMENT (OUTPATIENT)
Dept: OBGYN | Facility: CLINIC | Age: 39
End: 2024-01-17

## 2024-01-17 PROBLEM — Z87.59 PERSONAL HISTORY OF OTHER COMPLICATIONS OF PREGNANCY, CHILDBIRTH AND THE PUERPERIUM: Chronic | Status: ACTIVE | Noted: 2024-01-10

## 2024-01-17 PROBLEM — O43.199: Chronic | Status: ACTIVE | Noted: 2024-01-10

## 2024-01-24 ENCOUNTER — APPOINTMENT (OUTPATIENT)
Dept: OBGYN | Facility: CLINIC | Age: 39
End: 2024-01-24

## 2024-01-30 LAB — SURGICAL PATHOLOGY STUDY: SIGNIFICANT CHANGE UP

## 2024-02-21 ENCOUNTER — INPATIENT (INPATIENT)
Facility: HOSPITAL | Age: 39
LOS: 0 days | Discharge: HOME | DRG: 776 | End: 2024-02-22
Attending: SPECIALIST | Admitting: SPECIALIST
Payer: COMMERCIAL

## 2024-02-21 ENCOUNTER — APPOINTMENT (OUTPATIENT)
Dept: OBGYN | Facility: CLINIC | Age: 39
End: 2024-02-21
Payer: COMMERCIAL

## 2024-02-21 ENCOUNTER — EMERGENCY (EMERGENCY)
Facility: HOSPITAL | Age: 39
LOS: 1 days | Discharge: HOME | End: 2024-02-21
Attending: EMERGENCY MEDICINE
Payer: COMMERCIAL

## 2024-02-21 VITALS
OXYGEN SATURATION: 94 % | DIASTOLIC BLOOD PRESSURE: 66 MMHG | SYSTOLIC BLOOD PRESSURE: 144 MMHG | RESPIRATION RATE: 18 BRPM | HEART RATE: 112 BPM

## 2024-02-21 VITALS
WEIGHT: 141.98 LBS | HEART RATE: 130 BPM | TEMPERATURE: 98 F | SYSTOLIC BLOOD PRESSURE: 164 MMHG | HEIGHT: 67 IN | RESPIRATION RATE: 19 BRPM | DIASTOLIC BLOOD PRESSURE: 106 MMHG | OXYGEN SATURATION: 100 %

## 2024-02-21 VITALS
HEART RATE: 120 BPM | RESPIRATION RATE: 20 BRPM | SYSTOLIC BLOOD PRESSURE: 152 MMHG | DIASTOLIC BLOOD PRESSURE: 83 MMHG | OXYGEN SATURATION: 100 %

## 2024-02-21 VITALS — SYSTOLIC BLOOD PRESSURE: 160 MMHG | DIASTOLIC BLOOD PRESSURE: 90 MMHG

## 2024-02-21 VITALS — DIASTOLIC BLOOD PRESSURE: 99 MMHG | SYSTOLIC BLOOD PRESSURE: 168 MMHG

## 2024-02-21 LAB
ALBUMIN SERPL ELPH-MCNC: 4.3 G/DL — SIGNIFICANT CHANGE UP (ref 3.3–5)
ALBUMIN SERPL ELPH-MCNC: 4.4 G/DL — SIGNIFICANT CHANGE UP (ref 3.3–5)
ALP SERPL-CCNC: 28 U/L — LOW (ref 40–120)
ALP SERPL-CCNC: 28 U/L — LOW (ref 40–120)
ALT FLD-CCNC: 12 U/L — SIGNIFICANT CHANGE UP (ref 10–45)
ALT FLD-CCNC: 15 U/L — SIGNIFICANT CHANGE UP (ref 10–45)
ANION GAP SERPL CALC-SCNC: 12 MMOL/L — SIGNIFICANT CHANGE UP (ref 5–17)
ANION GAP SERPL CALC-SCNC: 15 MMOL/L — SIGNIFICANT CHANGE UP (ref 5–17)
ANISOCYTOSIS BLD QL: SLIGHT — SIGNIFICANT CHANGE UP
APTT BLD: 31.5 SEC — SIGNIFICANT CHANGE UP (ref 24.5–35.6)
AST SERPL-CCNC: 17 U/L — SIGNIFICANT CHANGE UP (ref 10–40)
AST SERPL-CCNC: 21 U/L — SIGNIFICANT CHANGE UP (ref 10–40)
BASOPHILS # BLD AUTO: 0 K/UL — SIGNIFICANT CHANGE UP (ref 0–0.2)
BASOPHILS NFR BLD AUTO: 0 % — SIGNIFICANT CHANGE UP (ref 0–2)
BILIRUB SERPL-MCNC: 0.2 MG/DL — SIGNIFICANT CHANGE UP (ref 0.2–1.2)
BILIRUB SERPL-MCNC: 0.2 MG/DL — SIGNIFICANT CHANGE UP (ref 0.2–1.2)
BUN SERPL-MCNC: 13 MG/DL — SIGNIFICANT CHANGE UP (ref 7–23)
BUN SERPL-MCNC: 14 MG/DL — SIGNIFICANT CHANGE UP (ref 7–23)
CALCIUM SERPL-MCNC: 9.2 MG/DL — SIGNIFICANT CHANGE UP (ref 8.4–10.5)
CALCIUM SERPL-MCNC: 9.3 MG/DL — SIGNIFICANT CHANGE UP (ref 8.4–10.5)
CHLORIDE SERPL-SCNC: 104 MMOL/L — SIGNIFICANT CHANGE UP (ref 96–108)
CHLORIDE SERPL-SCNC: 106 MMOL/L — SIGNIFICANT CHANGE UP (ref 96–108)
CO2 SERPL-SCNC: 21 MMOL/L — LOW (ref 22–31)
CO2 SERPL-SCNC: 24 MMOL/L — SIGNIFICANT CHANGE UP (ref 22–31)
CREAT SERPL-MCNC: 0.87 MG/DL — SIGNIFICANT CHANGE UP (ref 0.5–1.3)
CREAT SERPL-MCNC: 0.91 MG/DL — SIGNIFICANT CHANGE UP (ref 0.5–1.3)
DACRYOCYTES BLD QL SMEAR: SLIGHT — SIGNIFICANT CHANGE UP
EGFR: 84 ML/MIN/1.73M2 — SIGNIFICANT CHANGE UP
EGFR: 88 ML/MIN/1.73M2 — SIGNIFICANT CHANGE UP
ELLIPTOCYTES BLD QL SMEAR: SLIGHT — SIGNIFICANT CHANGE UP
EOSINOPHIL # BLD AUTO: 0 K/UL — SIGNIFICANT CHANGE UP (ref 0–0.5)
EOSINOPHIL NFR BLD AUTO: 0 % — SIGNIFICANT CHANGE UP (ref 0–6)
FIBRINOGEN PPP-MCNC: 293 MG/DL — SIGNIFICANT CHANGE UP (ref 200–445)
GLUCOSE SERPL-MCNC: 137 MG/DL — HIGH (ref 70–99)
GLUCOSE SERPL-MCNC: 95 MG/DL — SIGNIFICANT CHANGE UP (ref 70–99)
HCT VFR BLD CALC: 35.5 % — SIGNIFICANT CHANGE UP (ref 34.5–45)
HGB BLD-MCNC: 10.9 G/DL — LOW (ref 11.5–15.5)
INR BLD: 1.02 RATIO — SIGNIFICANT CHANGE UP (ref 0.85–1.18)
LDH SERPL L TO P-CCNC: 192 U/L — SIGNIFICANT CHANGE UP (ref 50–242)
LYMPHOCYTES # BLD AUTO: 2.6 K/UL — SIGNIFICANT CHANGE UP (ref 1–3.3)
LYMPHOCYTES # BLD AUTO: 29.6 % — SIGNIFICANT CHANGE UP (ref 13–44)
MAGNESIUM SERPL-MCNC: 7.1 MG/DL — HIGH (ref 1.6–2.6)
MANUAL SMEAR VERIFICATION: SIGNIFICANT CHANGE UP
MCHC RBC-ENTMCNC: 21.5 PG — LOW (ref 27–34)
MCHC RBC-ENTMCNC: 30.7 GM/DL — LOW (ref 32–36)
MCV RBC AUTO: 70 FL — LOW (ref 80–100)
MICROCYTES BLD QL: SLIGHT — SIGNIFICANT CHANGE UP
MONOCYTES # BLD AUTO: 0.23 K/UL — SIGNIFICANT CHANGE UP (ref 0–0.9)
MONOCYTES NFR BLD AUTO: 2.6 % — SIGNIFICANT CHANGE UP (ref 2–14)
NEUTROPHILS # BLD AUTO: 5.96 K/UL — SIGNIFICANT CHANGE UP (ref 1.8–7.4)
NEUTROPHILS NFR BLD AUTO: 67.8 % — SIGNIFICANT CHANGE UP (ref 43–77)
PLAT MORPH BLD: NORMAL — SIGNIFICANT CHANGE UP
PLATELET # BLD AUTO: 312 K/UL — SIGNIFICANT CHANGE UP (ref 150–400)
POIKILOCYTOSIS BLD QL AUTO: SLIGHT — SIGNIFICANT CHANGE UP
POTASSIUM SERPL-MCNC: 3.4 MMOL/L — LOW (ref 3.5–5.3)
POTASSIUM SERPL-MCNC: 4.7 MMOL/L — SIGNIFICANT CHANGE UP (ref 3.5–5.3)
POTASSIUM SERPL-SCNC: 3.4 MMOL/L — LOW (ref 3.5–5.3)
POTASSIUM SERPL-SCNC: 4.7 MMOL/L — SIGNIFICANT CHANGE UP (ref 3.5–5.3)
PROT SERPL-MCNC: 7.6 G/DL — SIGNIFICANT CHANGE UP (ref 6–8.3)
PROT SERPL-MCNC: 7.8 G/DL — SIGNIFICANT CHANGE UP (ref 6–8.3)
PROTHROM AB SERPL-ACNC: 11.2 SEC — SIGNIFICANT CHANGE UP (ref 9.5–13)
RBC # BLD: 5.07 M/UL — SIGNIFICANT CHANGE UP (ref 3.8–5.2)
RBC # FLD: 15.3 % — HIGH (ref 10.3–14.5)
RBC BLD AUTO: ABNORMAL
SCHISTOCYTES BLD QL AUTO: SLIGHT — SIGNIFICANT CHANGE UP
SODIUM SERPL-SCNC: 140 MMOL/L — SIGNIFICANT CHANGE UP (ref 135–145)
SODIUM SERPL-SCNC: 142 MMOL/L — SIGNIFICANT CHANGE UP (ref 135–145)
URATE SERPL-MCNC: 4.1 MG/DL — SIGNIFICANT CHANGE UP (ref 2.5–7)
WBC # BLD: 8.79 K/UL — SIGNIFICANT CHANGE UP (ref 3.8–10.5)
WBC # FLD AUTO: 8.79 K/UL — SIGNIFICANT CHANGE UP (ref 3.8–10.5)

## 2024-02-21 PROCEDURE — 0503F POSTPARTUM CARE VISIT: CPT

## 2024-02-21 PROCEDURE — 99285 EMERGENCY DEPT VISIT HI MDM: CPT

## 2024-02-21 PROCEDURE — 99234 HOSP IP/OBS SM DT SF/LOW 45: CPT

## 2024-02-21 RX ORDER — NIFEDIPINE 30 MG
30 TABLET, EXTENDED RELEASE 24 HR ORAL DAILY
Refills: 0 | Status: DISCONTINUED | OUTPATIENT
Start: 2024-02-21 | End: 2024-02-21

## 2024-02-21 RX ORDER — MAGNESIUM SULFATE 500 MG/ML
2 VIAL (ML) INJECTION
Qty: 40 | Refills: 0 | Status: DISCONTINUED | OUTPATIENT
Start: 2024-02-21 | End: 2024-02-21

## 2024-02-21 RX ORDER — SERTRALINE 25 MG/1
25 TABLET, FILM COATED ORAL DAILY
Qty: 7 | Refills: 0 | Status: ACTIVE | COMMUNITY
Start: 2024-02-21 | End: 1900-01-01

## 2024-02-21 RX ORDER — SODIUM CHLORIDE 9 MG/ML
1000 INJECTION, SOLUTION INTRAVENOUS
Refills: 0 | Status: DISCONTINUED | OUTPATIENT
Start: 2024-02-21 | End: 2024-02-22

## 2024-02-21 RX ORDER — ACETAMINOPHEN 500 MG
975 TABLET ORAL ONCE
Refills: 0 | Status: COMPLETED | OUTPATIENT
Start: 2024-02-21 | End: 2024-02-21

## 2024-02-21 RX ORDER — METOCLOPRAMIDE HCL 10 MG
10 TABLET ORAL ONCE
Refills: 0 | Status: COMPLETED | OUTPATIENT
Start: 2024-02-21 | End: 2024-02-21

## 2024-02-21 RX ORDER — MAGNESIUM SULFATE 500 MG/ML
4 VIAL (ML) INJECTION ONCE
Refills: 0 | Status: COMPLETED | OUTPATIENT
Start: 2024-02-21 | End: 2024-02-21

## 2024-02-21 RX ORDER — DIPHENHYDRAMINE HCL 50 MG
25 CAPSULE ORAL ONCE
Refills: 0 | Status: COMPLETED | OUTPATIENT
Start: 2024-02-21 | End: 2024-02-21

## 2024-02-21 RX ORDER — SERTRALINE HYDROCHLORIDE 50 MG/1
50 TABLET, FILM COATED ORAL DAILY
Qty: 90 | Refills: 2 | Status: ACTIVE | COMMUNITY
Start: 2024-02-21 | End: 1900-01-01

## 2024-02-21 RX ORDER — MAGNESIUM SULFATE 500 MG/ML
2 VIAL (ML) INJECTION
Qty: 40 | Refills: 0 | Status: DISCONTINUED | OUTPATIENT
Start: 2024-02-21 | End: 2024-02-22

## 2024-02-21 RX ADMIN — Medication 30 MILLIGRAM(S): at 16:27

## 2024-02-21 RX ADMIN — Medication 975 MILLIGRAM(S): at 16:34

## 2024-02-21 RX ADMIN — Medication 10 MILLIGRAM(S): at 20:33

## 2024-02-21 RX ADMIN — Medication 300 GRAM(S): at 14:27

## 2024-02-21 RX ADMIN — Medication 25 MILLIGRAM(S): at 20:35

## 2024-02-21 RX ADMIN — Medication 50 GM/HR: at 14:49

## 2024-02-21 NOTE — OB POSTPARTUM TRIAGE NOTE - NSOBPROVIDERNOTE_OBGYN_ALL_OB_FT
38 year old  PPD#41 h/o gHTN with elevated bps in office and here in triage; asymptomatic     -HELLP labs  -BP monitoring    d/w MD Binh Traylor FNP-BC

## 2024-02-21 NOTE — OB PROVIDER H&P - ASSESSMENT
38 year old  PPD #41 s/p  24 with persistent elevated bps.    -Admit to L and D  -Magnesium Sulfate for seizure prophylaxis  -Procardia 30mg XL daily  -HELLP labs q6 hours  -Close BP monitoring  -Regular diet    d/w MD Binh Traylor [facility]-BC

## 2024-02-21 NOTE — ED PROVIDER NOTE - OBJECTIVE STATEMENT
38-year-old female G2, P2 with history of preeclampsia induced in early January for evaluation of hypertension at her OB/GYN's clinic.  She had preeclampsia at the end of her previous pregnancy, required induction and delivered on January 11.  Has no symptoms denies headache, visual changes, chest pain, shortness of breath, fever, chills dizziness or lightheadedness at this time.

## 2024-02-21 NOTE — OB PROVIDER H&P - ATTENDING COMMENTS
Pt seen chart rev. As per above note. Agree with management and plan.  Agree with above management and plan  will mag for seizure prophylaxis, and start procardia to control BP.   Will get cardiology consult to help manage BP.   Jill ANDREW

## 2024-02-21 NOTE — ED PROVIDER NOTE - ATTENDING CONTRIBUTION TO CARE
Patient is a 38-year-old female, G2, P2, s/p  on 2024, induced secondary to high blood pressure, now sent in by her OB/GYN, Dr. Cummings for concern of elevated blood pressure.  Patient patient is 6 weeks from her delivery and was sent in for concern of preeclampsia.  Patient states that she was never put on medications for her blood pressure during her pregnancy.  She denies any headache, vision changes.  She denies chest pain or shortness of breath.  She states she recently had a cold.  Denies any fevers, nausea or vomiting.  No abdominal pain.  Patient states she feels anxious.    VS noted  Gen. no acute distress, Non toxic   HEENT: EOMI, mmm  Lungs: CTAB/L no C/ W /R   CVS:  tachycardic  Abd; Soft non tender, non distended   Ext: no edema  Skin: no rash  Neuro AAOx3 non focal clear speech  a/p:  elevated blood pressure–BP is 152/83.  Patient has no headache, vision changes, focal weakness numbness.  I called OB/GYN and discussed this case with them.  At this time, they recommended that blood to be drawn and patient be sent to L&D triage for further evaluation.  - Sancho ANDREW

## 2024-02-21 NOTE — PATIENT PROFILE ADULT - FALL HARM RISK - UNIVERSAL INTERVENTIONS
Bed in lowest position, wheels locked, appropriate side rails in place/Call bell, personal items and telephone in reach/Instruct patient to call for assistance before getting out of bed or chair/Non-slip footwear when patient is out of bed/[place] to call system/Physically safe environment - no spills, clutter or unnecessary equipment/Purposeful Proactive Rounding/Room/bathroom lighting operational, light cord in reach

## 2024-02-21 NOTE — ED PROVIDER NOTE - CONSULTANT FREE TEXT FOR MDM DISCUSSED CASE WITH QUESTION
Subjective:     Patient ID: Dennis Gilford is a 66 y.o. female who presentstoday for:  Chief Complaint   Patient presents with    Concern For COVID-19       URI   This is a new problem. The current episode started in the past 7 days. The problem has been waxing and waning. Associated symptoms include abdominal pain, congestion, coughing, diarrhea, headaches, joint pain, nausea, rhinorrhea and a sore throat. Pertinent negatives include no chest pain, dysuria, ear pain, joint swelling, neck pain, plugged ear sensation, rash, sinus pain, sneezing, swollen glands, vomiting or wheezing. She has tried sleep for the symptoms. Past Medical History:   Diagnosis Date    Anxiety     Cerebral artery occlusion with cerebral infarction ([address]     Diabetes mellitus (HCC)     Pre Diabetic    Glucose intolerance (pre-diabetes)     History of CVA (cerebrovascular accident) 5/8/2018    History of TIA (transient ischemic attack) 5/10/2018    Hypercholesteremia     Hypertension     Rosacea      Current Outpatient Medications on File Prior to Visit   Medication Sig Dispense Refill    clopidogrel (PLAVIX) 75 MG tablet Take 1 tablet by mouth daily 90 tablet 3    hydroCHLOROthiazide (MICROZIDE) 12.5 MG capsule Take 1 capsule by mouth every morning 90 capsule 3    metFORMIN (GLUCOPHAGE) 500 MG tablet Take 1 tablet by mouth 2 times daily (with meals) 180 tablet 3    Vitamin D (CHOLECALCIFEROL) 1000 UNITS CAPS capsule Take 1,000 Units by mouth daily      Bioflavonoid Products (FLY C PO) Take 500 mg by mouth daily       No current facility-administered medications on file prior to visit.       Past Surgical History:   Procedure Laterality Date    CHOLECYSTECTOMY      GALLBLADDER SURGERY  1995        Family History   Problem Relation Age of Onset    Heart Disease Mother         heart attacks  age 66's   Voncile Searing Arthritis Mother     Diabetes Father     Emphysema Other      Social History     Socioeconomic History    Marital status:      Spouse name: Not on file    Number of children: Not on file    Years of education: Not on file    Highest education level: Not on file   Occupational History    Not on file   Social Needs    Financial resource strain: Not hard at all    Food insecurity     Worry: Never true     Inability: Never true   [employer] needs     Medical: No     Non-medical: No   Tobacco Use    Smoking status: Former Smoker     Packs/day: 1.00     Years: 10.00     Pack years: 10.00     Types: Cigarettes     Quit date: 2009     Years since quittin.0    Smokeless tobacco: Never Used   Substance and Sexual Activity    Alcohol use: Yes     Comment: seldom    Drug use: No    Sexual activity: Never   Lifestyle    Physical activity     Days per week: Not on file     Minutes per session: Not on file    Stress: Not on file   Relationships    Social connections     Talks on phone: Not on file     Gets together: Not on file     Attends [employer]ism service: Not on file     Active member of club or organization: Not on file     Attends meetings of clubs or organizations: Not on file     Relationship status: Not on file    Intimate partner violence     Fear of current or ex partner: Not on file     Emotionally abused: Not on file     Physically abused: Not on file     Forced sexual activity: Not on file   Other Topics Concern    Not on file   Social History Narrative    Not on file     Allergies:  Ceclor [cefaclor] and Pcn [penicillins]    Review of Systems   Constitutional: Positive for chills, diaphoresis, fatigue and fever. HENT: Positive for congestion, rhinorrhea and sore throat. Negative for ear pain, sinus pain and sneezing. Respiratory: Positive for cough. Negative for shortness of breath and wheezing. Cardiovascular: Negative for chest pain. Gastrointestinal: Positive for abdominal pain, diarrhea and nausea.  Negative for abdominal distention, anal bleeding, blood in stool, constipation, rectal pain and vomiting. Genitourinary: Negative. Negative for dysuria. Musculoskeletal: Positive for joint pain and myalgias. Negative for neck pain. Skin: Negative for rash. Neurological: Positive for headaches. Negative for dizziness, syncope, weakness and light-headedness. Psychiatric/Behavioral: Negative. Objective: There were no vitals taken for this visit. Physical Exam  Pulmonary:      Effort: No respiratory distress. Neurological:      Mental Status: She is alert and oriented to person, place, and time. Psychiatric:         Mood and Affect: Mood normal.         Behavior: Behavior normal.         Assessment & Plan:       Diagnosis Orders   1. Upper respiratory tract infection, unspecified type  COVID-19 Ambulatory    azithromycin (ZITHROMAX) 250 MG tablet     Orders Placed This Encounter   Procedures    COVID-19 Ambulatory     Standing Status:   Future     Standing Expiration Date:   3/8/2022     Scheduling Instructions:      Saline media preferred given current shortage of viral transport media but both acceptable     Order Specific Question:   Is this test for diagnosis or screening? Answer:   Diagnosis of ill patient     Order Specific Question:   Symptomatic for COVID-19 as defined by CDC? Answer:   Yes     Order Specific Question:   Date of Symptom Onset     Answer:   3/4/2021     Order Specific Question:   Hospitalized for COVID-19? Answer:   No     Order Specific Question:   Admitted to ICU for COVID-19? Answer:   No     Order Specific Question:   Employed in healthcare setting? Answer:   No     Order Specific Question:   Resident in a congregate (group) care setting? Answer:   No     Order Specific Question:   Pregnant? Answer:   No     Order Specific Question:   Previously tested for COVID-19?      Answer:   No     Orders Placed This Encounter   Medications    azithromycin (ZITHROMAX) 250 MG tablet     Sig: Take 1 tablet by mouth See Admin Instructions for 5 days 500mg on day 1 followed by 250mg on days 2 - 5     Dispense:  6 tablet     Refill:  0     There are no discontinued medications. Return if symptoms worsen or fail to improve. TELEHEALTH EVALUATION -- Audio/Visual (During YTKDO-67 public health emergency)    -   Radha Ruiz is a 66 y.o. female being evaluated by a Virtual Visit (video visit) encounter to address concerns as mentioned above. A caregiver was present when appropriate. Due to this being a TeleHealth encounter (During CVCVA-38 public health emergency), evaluation of the following organ systems was limited: Vitals/Constitutional/EENT/Resp/CV/GI//MS/Neuro/Skin/Heme-Lymph-Imm. Pursuant to the emergency declaration under the [address] [place] [address], [address] [place] [address] authority and the [employer] Act, this Virtual Visit was conducted with patient's (and/or legal guardian's) consent, to reduce the patient's risk of exposure to COVID-19 and provide necessary medical care. The patient (and/or legal guardian) has also been advised to contact this office for worsening conditions or problems, and seek emergency medical treatment and/or call 911 if deemed necessary. Services were provided through a video synchronous discussion virtually to substitute for in-person clinic visit. Type of encounter was _x_ Doxy __ MyChart ___Facetime    Patient was located at their home. Provider was located at their _x__ home or        ____ office. --DEBBIE Caldwell - CNP on 3/8/2021 at 8:46 AM    An electronic signature was used to authenticate this note. Reviewed with the patient: currentclinical status, medications, activities and diet.      Side effects, adverse effects of the medicationprescribed today, as well as treatment plan/ rationale and result expectations havebeen discussed with the patient who expresses understanding and desires to proceed. Pt instructions reviewed and given to patient.     Close follow up to evaluate treatment resultsand for coordination of care. I have reviewed the patient's medical historyin detail and updated the computerized patient record.     DEBBIE Pacheco - CNP                 obgyn

## 2024-02-21 NOTE — OB PROVIDER H&P - NSTRANFUSIONOBJECTION_GEN_ALL_CORE_SIUH
Discussed with patient daughter.  Updated on TTE report; explored advance directive  Wants all to be done  Mother was on Ventilator for 19 days with COVID and all decision left to children Patient has no objection to blood transfusions.

## 2024-02-21 NOTE — OB POSTPARTUM TRIAGE NOTE - NSHPPHYSICALEXAM_GEN_ALL_CORE
English
Vital Signs Last 24 Hrs  T(C): 36.6 (21 Feb 2024 11:05), Max: 36.6 (21 Feb 2024 11:05)  T(F): 97.9 (21 Feb 2024 11:05), Max: 97.9 (21 Feb 2024 11:05)  HR: 112 (21 Feb 2024 12:21) (112 - 130)  BP: 144/66 (21 Feb 2024 12:21) (144/66 - 164/106)  BP(mean): --  RR: 18 (21 Feb 2024 12:21) (18 - 20)  SpO2: 94% (21 Feb 2024 12:21) (94% - 100%)    PE: NAD, AOX3, abdomen soft non tender

## 2024-02-21 NOTE — OB POSTPARTUM TRIAGE NOTE - HISTORY OF PRESENT ILLNESS
Increasedv BP's in OB office today for PP check up 38 year old  s/p  24 sent from OB office with elevated bps (160/90s). Patient reports headache last night that resolved without treatment-no headache this morning; denies visual changes, nausea, vomiting, epigastric pain.    OBHx: FT  , FT  , ectopic s/p laparoscopic right salpingectomy  MedHx: Mediterranean thalassemia, hypothyroidism  SurgHx: laparoscopic right salpingectomy  GynHx: reports one small fiboid; denies cysts, abnormal paps, STIs  SocialHx: denies ETOH, tobacco, drug use  PsychHx: denies anxiety, depression, PPD  All: NKDA, NKEA, NKFA  Meds: PNV, levothyroxine

## 2024-02-21 NOTE — OB PROVIDER H&P - HISTORY OF PRESENT ILLNESS
38 year old  s/p  24 sent from OB office with elevated bps (160/90s). Patient reports headache last night that resolved without treatment-no headache this morning; denies visual changes, nausea, vomiting, epigastric pain.    OBHx: FT  , FT  , ectopic s/p laparoscopic right salpingectomy  MedHx: Mediterranean thalassemia, hypothyroidism  SurgHx: laparoscopic right salpingectomy  GynHx: reports one small fiboid; denies cysts, abnormal paps, STIs  SocialHx: denies ETOH, tobacco, drug use  PsychHx: denies anxiety, depression, PPD  All: NKDA, NKEA, NKFA  Meds: PNV, levothyroxine

## 2024-02-21 NOTE — ED PROVIDER NOTE - AOU DETAILS
Patient is post-partum 6 weeks, presenting with high blood pressure. She has no symptoms of chest pain, shortness of breath, headache, weakness. Discussed with OBGYN, requesting that patient be sent to L&D.

## 2024-02-21 NOTE — OB POSTPARTUM TRIAGE NOTE - TERM DELIVERIES, OB PROFILE
Mr. Marybel Dangelo is a 70 y.o. y/o male with history of Heart Valve Replacement, aortic valve, about 1997 by Sulema Morales at [facility]   He presents today for anticoagulation monitoring and adjustment. Pertinent AFib, PMH: HTN, CHF, Chronic Kidney disease(moderate)    Patient Reported Findings:  Yes     No  [x]   []       Patient verifies current dosing regimen as listed- follows AVS calendar---> pt presents to apt alone, doesn't know dosing   []   [x]       S/S bleeding/bruising/swelling/SOB-denies   []   [x]       Blood in urine or stool- denies   []   [x]       Procedures scheduled in the future at this time  []   [x]       Missed Dose   []   [x]       Extra Dose  []   [x]       Change in medications pt states that he is no longer taking digoxin---> allopurinol dose dec to 100mg daily ---> no changes ---> inc lasix and coreg, stop cardizem, then dc lasix and changed to torsemide, taking celebrex ---->started amiodarone outpatient with 400 mg TID x 7 days (today is last day of TID, 8/11), starting 8/12 400 mg BID x 7 days, 400 mg qd x 7 days, then 200 mg qd. Inc torsemide   []   [x]       Change in health/diet/appetite- Overall his appetite is poor, he states that he \"hasn't been eating much of anything lately\". He states that he continues to drink Ensure every day. Has not been eating much/missed a few ensures. -->missed on Ensure but otherwise the same --->says he has not been eating much greens ---> patient reports N/V/D for about 3 days last week. Symptoms now resolved. Did not go to doctor, no abx or new medications---> no changes---> not much greens recently, no NVD---> thinks had greens a few time since home, no NVD   []   [x]       Change in alcohol use does not drink  []   [x]       Change in activity  []   [x]       Hospital admission- pt was in hosptial 7/29-8/5 for a fib. Was started on diltiazem and amiodarone drip. was held first 2 days of admission then resumed on 8/1 with home dose.  INR on d/c 2.7 was started on amiodarone outpatient with 400 mg TID x 7 days, 400 mg BID x 7 days, 400 mg qd x 7 days, then 200 mg qd.  []   [x]       Emergency department visit  []   [x]       Other complaints    Clinical Outcomes:  Yes     No  []   [x]       Major bleeding event  []   [x]       Thromboembolic event    Patient states that he gets brand Coumadin from mail order. States \"warfarin just doesn't work for [employer] Duration of warfarin Therapy: indefinite  INR Range:  2.5-3.5     **they have stopped making Brand Coumadin so patient is going to have to switch over to generic warfarin, explained this to him and daughter at apt today. Also switched tablet strength to 2mg warfarin tablets and went over the switch with patient and daughter. INR is 3.6 today   Patient started high dose amiodarone taper. 400mg TID ending today then starting 8/12 400 mg BID x 7 days. Dose of warfarin was decreased to 2mg daily on 8/6 and INR is 3.6 today. Will need new tablet strength and to decrease dose further. Educated daughter and went over AVS several times. Reviewed tablet strength several times. Decrease dose to 2mg Mon, Wed and Fri and 1mg all other days. Encouraged to maintain a consistency of vegetables/salads. RF called into OPP for 2mg tablets  Recheck INR on Monday, 8/17    Referring cardiologist is Dr. Tapan Herrera. INR (no units)   Date Value   08/11/2020 3.6   08/05/2020 2.76 (H)   08/04/2020 2.12 (H)   08/03/2020 1.79 (H)   08/02/2020 1.66 (H)       CLINICAL PHARMACY CONSULT: MED RECONCILIATION/REVIEW ADDENDUM    For Pharmacy Admin Tracking Only    PHSO: Yes  Total # of Interventions Recommended: 3  - Decreased Dose #: 1  - Refills Provided #: 1  - Updated Order #: 1 Updated Order Reason(s):  Other  - Maintenance Safety Lab Monitoring #: 1  Total Interventions Accepted: 3  Time Spent (min): [address], PharmD 2

## 2024-02-21 NOTE — ED ADULT NURSE NOTE - OBJECTIVE STATEMENT
39 y/o female  presenting to ED for elevated BP post partum. pt reports that she went to her 6 week checkup after delivery and was found to be hypertensive in office and referred to ED for further management. Upon exam pt A&Ox3 gross neuro intact, no difficulty speaking in complete sentences, s1s2 heart sounds heard, pulses x 4, monsalve x4, abdomen  nondistended, skin intact. pt denies chest pain, sob, ha, n/v/d, abdominal pain, f/c, urinary symptoms, hematuria.

## 2024-02-21 NOTE — OB PROVIDER H&P - PROBLEM SELECTOR PLAN 1
38 year old  PPD #41 s/p  24 with persistent elevated bps.    -Admit to L and D  -Magnesium Sulfate for seizure prophylaxis  -Procardia 30mg XL daily  -HELLP labs q6 hours  -Close BP monitoring  -Regular diet

## 2024-02-21 NOTE — ED PROVIDER NOTE - PHYSICAL EXAMINATION
CONSTITUTIONAL: Well appearing, awake, alert, oriented to person, place, time/situation and in no apparent distress.  ENMT: Airway patent, Nasal mucosa clear. Mouth with normal mucosa  EYES: Clear bilaterally, pupils equal, round and reactive to light.  CARDIAC: Normal rate, regular rhythm.  Heart sounds S1, S2.  No murmurs, rubs or gallops.  RESPIRATORY: Breath sounds clear and equal bilaterally.   GASTROINTESTINAL: soft, nontender, no rebound, no guarding   MUSCULOSKELETAL: No bruising, no lacerations, normal ROM, no deformity  NEUROLOGICAL: Alert and oriented, no focal deficits, no motor or sensory deficits.   SKIN: No bruising, no lacerations, no rash, no signs of external trauma

## 2024-02-21 NOTE — ED PROVIDER NOTE - CLINICAL SUMMARY MEDICAL DECISION MAKING FREE TEXT BOX
38-year-old female induced for preeclampsia in early January with normal delivery, normal postpartum course presents emergency department due to hypertension.  Within 6-week window for concerns of preeclampsia.  No symptoms at this time that require emergent treatment with magnesium or blood pressure lowering medications at this time.  I discussed with OB/GYN, they will come evaluate patient.  Blood work ordered at this time.  Remained stable.

## 2024-02-22 ENCOUNTER — TRANSCRIPTION ENCOUNTER (OUTPATIENT)
Age: 39
End: 2024-02-22

## 2024-02-22 VITALS
DIASTOLIC BLOOD PRESSURE: 64 MMHG | RESPIRATION RATE: 18 BRPM | OXYGEN SATURATION: 99 % | SYSTOLIC BLOOD PRESSURE: 114 MMHG | HEART RATE: 100 BPM | TEMPERATURE: 97 F

## 2024-02-22 LAB
ALBUMIN SERPL ELPH-MCNC: 4.4 G/DL — SIGNIFICANT CHANGE UP (ref 3.3–5)
ALP SERPL-CCNC: 28 U/L — LOW (ref 40–120)
ALT FLD-CCNC: 12 U/L — SIGNIFICANT CHANGE UP (ref 10–45)
ANION GAP SERPL CALC-SCNC: 15 MMOL/L — SIGNIFICANT CHANGE UP (ref 5–17)
APTT BLD: 30.5 SEC — SIGNIFICANT CHANGE UP (ref 24.5–35.6)
AST SERPL-CCNC: 18 U/L — SIGNIFICANT CHANGE UP (ref 10–40)
BASOPHILS # BLD AUTO: 0.02 K/UL — SIGNIFICANT CHANGE UP (ref 0–0.2)
BASOPHILS NFR BLD AUTO: 0.2 % — SIGNIFICANT CHANGE UP (ref 0–2)
BILIRUB SERPL-MCNC: 0.2 MG/DL — SIGNIFICANT CHANGE UP (ref 0.2–1.2)
BUN SERPL-MCNC: 9 MG/DL — SIGNIFICANT CHANGE UP (ref 7–23)
CALCIUM SERPL-MCNC: 6.5 MG/DL — CRITICAL LOW (ref 8.4–10.5)
CHLORIDE SERPL-SCNC: 99 MMOL/L — SIGNIFICANT CHANGE UP (ref 96–108)
CO2 SERPL-SCNC: 21 MMOL/L — LOW (ref 22–31)
CREAT SERPL-MCNC: 0.72 MG/DL — SIGNIFICANT CHANGE UP (ref 0.5–1.3)
EGFR: 111 ML/MIN/1.73M2 — SIGNIFICANT CHANGE UP
EOSINOPHIL # BLD AUTO: 0.01 K/UL — SIGNIFICANT CHANGE UP (ref 0–0.5)
EOSINOPHIL NFR BLD AUTO: 0.1 % — SIGNIFICANT CHANGE UP (ref 0–6)
FIBRINOGEN PPP-MCNC: 301 MG/DL — SIGNIFICANT CHANGE UP (ref 200–445)
GLUCOSE SERPL-MCNC: 112 MG/DL — HIGH (ref 70–99)
HCT VFR BLD CALC: 35 % — SIGNIFICANT CHANGE UP (ref 34.5–45)
HGB BLD-MCNC: 10.7 G/DL — LOW (ref 11.5–15.5)
HPV HIGH+LOW RISK DNA PNL CVX: NOT DETECTED
IMM GRANULOCYTES NFR BLD AUTO: 0.3 % — SIGNIFICANT CHANGE UP (ref 0–0.9)
INR BLD: 0.97 RATIO — SIGNIFICANT CHANGE UP (ref 0.85–1.18)
LDH SERPL L TO P-CCNC: 210 U/L — SIGNIFICANT CHANGE UP (ref 50–242)
LYMPHOCYTES # BLD AUTO: 1.22 K/UL — SIGNIFICANT CHANGE UP (ref 1–3.3)
LYMPHOCYTES # BLD AUTO: 9.6 % — LOW (ref 13–44)
MAGNESIUM SERPL-MCNC: 7.1 MG/DL — HIGH (ref 1.6–2.6)
MAGNESIUM SERPL-MCNC: 7.6 MG/DL — HIGH (ref 1.6–2.6)
MCHC RBC-ENTMCNC: 21 PG — LOW (ref 27–34)
MCHC RBC-ENTMCNC: 30.6 GM/DL — LOW (ref 32–36)
MCV RBC AUTO: 68.6 FL — LOW (ref 80–100)
MONOCYTES # BLD AUTO: 0.29 K/UL — SIGNIFICANT CHANGE UP (ref 0–0.9)
MONOCYTES NFR BLD AUTO: 2.3 % — SIGNIFICANT CHANGE UP (ref 2–14)
NEUTROPHILS # BLD AUTO: 11.07 K/UL — HIGH (ref 1.8–7.4)
NEUTROPHILS NFR BLD AUTO: 87.5 % — HIGH (ref 43–77)
NRBC # BLD: 0 /100 WBCS — SIGNIFICANT CHANGE UP (ref 0–0)
PLATELET # BLD AUTO: 289 K/UL — SIGNIFICANT CHANGE UP (ref 150–400)
POTASSIUM SERPL-MCNC: 4.2 MMOL/L — SIGNIFICANT CHANGE UP (ref 3.5–5.3)
POTASSIUM SERPL-SCNC: 4.2 MMOL/L — SIGNIFICANT CHANGE UP (ref 3.5–5.3)
PROT SERPL-MCNC: 7.9 G/DL — SIGNIFICANT CHANGE UP (ref 6–8.3)
PROTHROM AB SERPL-ACNC: 10.2 SEC — SIGNIFICANT CHANGE UP (ref 9.5–13)
RBC # BLD: 5.1 M/UL — SIGNIFICANT CHANGE UP (ref 3.8–5.2)
RBC # FLD: 15 % — HIGH (ref 10.3–14.5)
SODIUM SERPL-SCNC: 135 MMOL/L — SIGNIFICANT CHANGE UP (ref 135–145)
URATE SERPL-MCNC: 3.2 MG/DL — SIGNIFICANT CHANGE UP (ref 2.5–7)
WBC # BLD: 12.65 K/UL — HIGH (ref 3.8–10.5)
WBC # FLD AUTO: 12.65 K/UL — HIGH (ref 3.8–10.5)

## 2024-02-22 PROCEDURE — 85730 THROMBOPLASTIN TIME PARTIAL: CPT

## 2024-02-22 PROCEDURE — 83735 ASSAY OF MAGNESIUM: CPT

## 2024-02-22 PROCEDURE — 85025 COMPLETE CBC W/AUTO DIFF WBC: CPT

## 2024-02-22 PROCEDURE — 83615 LACTATE (LD) (LDH) ENZYME: CPT

## 2024-02-22 PROCEDURE — 36415 COLL VENOUS BLD VENIPUNCTURE: CPT

## 2024-02-22 PROCEDURE — 85610 PROTHROMBIN TIME: CPT

## 2024-02-22 PROCEDURE — 84550 ASSAY OF BLOOD/URIC ACID: CPT

## 2024-02-22 PROCEDURE — 80053 COMPREHEN METABOLIC PANEL: CPT

## 2024-02-22 PROCEDURE — 85384 FIBRINOGEN ACTIVITY: CPT

## 2024-02-22 RX ORDER — LEVOTHYROXINE SODIUM 125 MCG
75 TABLET ORAL DAILY
Refills: 0 | Status: DISCONTINUED | OUTPATIENT
Start: 2024-02-22 | End: 2024-02-22

## 2024-02-22 RX ORDER — LEVOTHYROXINE SODIUM 125 MCG
1 TABLET ORAL
Qty: 0 | Refills: 0 | DISCHARGE
Start: 2024-02-22

## 2024-02-22 RX ORDER — ONDANSETRON 8 MG/1
4 TABLET, FILM COATED ORAL ONCE
Refills: 0 | Status: COMPLETED | OUTPATIENT
Start: 2024-02-22 | End: 2024-02-22

## 2024-02-22 RX ORDER — NIFEDIPINE 30 MG
1 TABLET, EXTENDED RELEASE 24 HR ORAL
Qty: 0 | Refills: 0
Start: 2024-02-22

## 2024-02-22 RX ORDER — SERTRALINE 25 MG/1
25 TABLET, FILM COATED ORAL DAILY
Refills: 0 | Status: DISCONTINUED | OUTPATIENT
Start: 2024-02-22 | End: 2024-02-22

## 2024-02-22 RX ORDER — ACETAMINOPHEN 500 MG
975 TABLET ORAL EVERY 6 HOURS
Refills: 0 | Status: DISCONTINUED | OUTPATIENT
Start: 2024-02-22 | End: 2024-02-22

## 2024-02-22 RX ORDER — ALPRAZOLAM 0.25 MG
1 TABLET ORAL
Qty: 0 | Refills: 0 | DISCHARGE
Start: 2024-02-22

## 2024-02-22 RX ORDER — ALPRAZOLAM 0.25 MG/1
0.25 TABLET ORAL 4 TIMES DAILY
Qty: 15 | Refills: 0 | Status: ACTIVE | COMMUNITY
Start: 2024-02-22 | End: 1900-01-01

## 2024-02-22 RX ORDER — NIFEDIPINE 30 MG
30 TABLET, EXTENDED RELEASE 24 HR ORAL DAILY
Refills: 0 | Status: DISCONTINUED | OUTPATIENT
Start: 2024-02-22 | End: 2024-02-22

## 2024-02-22 RX ORDER — ALPRAZOLAM 0.25 MG
0.25 TABLET ORAL
Refills: 0 | Status: DISCONTINUED | OUTPATIENT
Start: 2024-02-22 | End: 2024-02-22

## 2024-02-22 RX ORDER — MAGNESIUM SULFATE 500 MG/ML
1 VIAL (ML) INJECTION
Qty: 40 | Refills: 0 | Status: DISCONTINUED | OUTPATIENT
Start: 2024-02-22 | End: 2024-02-22

## 2024-02-22 RX ORDER — ACETAMINOPHEN 500 MG
3 TABLET ORAL
Qty: 0 | Refills: 0 | DISCHARGE
Start: 2024-02-22

## 2024-02-22 RX ORDER — SERTRALINE 25 MG/1
1 TABLET, FILM COATED ORAL
Qty: 0 | Refills: 0 | DISCHARGE
Start: 2024-02-22

## 2024-02-22 RX ADMIN — Medication 75 MICROGRAM(S): at 06:20

## 2024-02-22 RX ADMIN — Medication 975 MILLIGRAM(S): at 13:00

## 2024-02-22 RX ADMIN — Medication 975 MILLIGRAM(S): at 01:03

## 2024-02-22 RX ADMIN — ONDANSETRON 4 MILLIGRAM(S): 8 TABLET, FILM COATED ORAL at 07:54

## 2024-02-22 RX ADMIN — SERTRALINE 25 MILLIGRAM(S): 25 TABLET, FILM COATED ORAL at 11:41

## 2024-02-22 RX ADMIN — Medication 25 GM/HR: at 03:42

## 2024-02-22 RX ADMIN — Medication 975 MILLIGRAM(S): at 14:32

## 2024-02-22 RX ADMIN — Medication 0.25 MILLIGRAM(S): at 11:41

## 2024-02-22 NOTE — DISCHARGE NOTE OB - PATIENT PORTAL LINK FT
You can access the FollowMyHealth Patient Portal offered by [facility] by registering at the following website: [email or web address]. By joining [email or web address] FollowMyHealth portal, you will also be able to view your health information using other applications (apps) compatible with our system.

## 2024-02-22 NOTE — PROGRESS NOTE ADULT - ASSESSMENT
A/P: 37yo  PPD#42 s/p  readmitted with severe pre-eclampsia on Mg that was decreased to 1g/hr overnight 2/2 side effects. BP well controlled on Pro 30XL.    #sPEC  - -130/60-80s, ctm q4 hrs  - c/w Mg x24 hrs  - Mg level therapeutic at 7.6, q6hr Mg level  - HELLP labs wnl  - f/u AM HELLP labs  - c/w Pro 30XL    #Postpartum  - Pain well controlled, continue current pain regimen  - Increase ambulation, SCDs when not ambulating  - Continue regular diet  - Discharge planning     ABI Anderson PGY3

## 2024-02-22 NOTE — PROVIDER CONTACT NOTE (OTHER) - ACTION/TREATMENT ORDERED:
dr rivas called and made aware.  magnesium gtt rate lowered to 1gm.  magnesium level from 0230 resulted 7.6.  plan of care ongoing.  call light in reach.

## 2024-02-22 NOTE — DISCHARGE NOTE OB - MEDICATION SUMMARY - MEDICATIONS TO TAKE
I will START or STAY ON the medications listed below when I get home from the hospital:    acetaminophen 325 mg oral tablet  -- 3 tab(s) by mouth every 6 hours as needed for  mild pain  -- Indication: For mild pain    sertraline 25 mg oral tablet  -- 1 tab(s) by mouth once a day  -- Indication: For Postpartum anxiety    ALPRAZolam 0.25 mg oral tablet  -- 1 tab(s) by mouth 4 times a day As needed anxiety  -- Indication: For Postpartum anxiety    Prenatal Multivitamins with Folic Acid 1 mg oral tablet  -- 1 tab(s) by mouth once a day  -- Indication: For Postpartum    levothyroxine 75 mcg (0.075 mg) oral tablet  -- 1 tab(s) by mouth once a day  -- Indication: For home med

## 2024-02-22 NOTE — DISCHARGE NOTE OB - CARE PLAN
1 Principal Discharge DX:	Preeclampsia in postpartum period  Assessment and plan of treatment:	continue procardia and hold med if BP <110/60  follow up in 1 week for BP check  prescription sent to pharmacy  Secondary Diagnosis:	Postpartum anxiety  Assessment and plan of treatment:	start zoloft 25mg for 7 day and increase to 50mg in 1 week.   xanax as needed. prescription for both meds sent to pharmacy

## 2024-02-22 NOTE — DISCHARGE NOTE OB - CARE PROVIDER_API CALL
Judson Abreu  Obstetrics and Gynecology  [address]  [place] [address]  Phone: [phone]  Fax: [phone]  Follow Up Time:

## 2024-02-22 NOTE — DISCHARGE NOTE OB - PLAN OF CARE
continue procardia and hold med if BP <110/60  follow up in 1 week for BP check  prescription sent to pharmacy start zoloft 25mg for 7 day and increase to 50mg in 1 week.   xanax as needed. prescription for both meds sent to pharmacy

## 2024-02-22 NOTE — DISCHARGE NOTE OB - NS MD DC FALL RISK RISK
For information on Fall & Injury Prevention, visit: [email or web address] OR  [email or web address] OR  https://www.cdc.gov/steadi/patient.html

## 2024-02-22 NOTE — DISCHARGE NOTE OB - HOSPITAL COURSE
Pt was readmitted 6 weeks postpartum for high blood pressures in the office. Pt was also diagnosed at the same time with postpartum anxiety on that same visit and was also recommend to start medication. for the preeclampsia she received magnesium and oral medication procardia. Her BPs are improved. and she has been seen by SW for resources for PPA. PT stable for discharge.

## 2024-02-22 NOTE — DISCHARGE NOTE OB - NS OB DC PLAN IMMU TDAP DATE
Addended by: NATALIE KAPOOR on: 5/23/2019 12:34 PM     Modules accepted: Orders    
during the preganancy

## 2024-02-22 NOTE — PROGRESS NOTE ADULT - ATTENDING COMMENTS
A/P: 39yo  PPD#42 s/p  readmitted with severe pre-eclampsia on Mg that was decreased to 1g/hr overnight 2/2 side effects. BP well controlled on Pro 30XL.    Spec: D/c mag at this time 2/2 pt being symptomatic. now bps well controlled and admission HELLP labs were all normal.   continue close BP monitoring, and hold parameters for procardia.     Postpartum Anxiety: Pt was newly diagnosed with postpartum anxiety at the office visit where high bps were noted. At the time the out patient physician had started her on zoloft - which she has not yet received in the hospital. Pt today reports significant anxiety, has not experienced this level in her previous pregnancy.   - stat dose of 0.25mg xanax given and zoloft 25mg started for patient.   - sw also called to evaluate for out patient resources.     will continue to monitor BPs today and if well controlled will plan for d/c home and further close outpatient mgmt

## 2024-02-22 NOTE — PROGRESS NOTE ADULT - SUBJECTIVE AND OBJECTIVE BOX
OB Progress Note:  PPD#42    S: 37yo  PPD#42 s/p  readmitted with postpartum severe pre-eclampsia. Patient did not tolerate magnesium side effect well, maintenance dose decreased to 1g/hr overnight. Pain is well controlled. She is tolerating a regular diet and passing flatus. She is voiding spontaneously, and ambulating without difficulty. Denies CP/SOB. Denies HA/lightheadedness/dizziness. Denies N/V. Denies calf pain    O:  Vitals:   Vital Signs Last 24 Hrs  T(C): 36.3 (2024 02:02), Max: 36.7 (2024 22:32)  T(F): 97.4 (2024 02:02), Max: 98 (2024 22:32)  HR: 82 (2024 04:03) (82 - 130)  BP: 108/73 (2024 04:03) (103/60 - 164/106)  BP(mean): --  RR: 18 (2024 02:02) (18 - 20)  SpO2: 99% (2024 02:02) (94% - 100%)    Parameters below as of 2024 02:02  Patient On (Oxygen Delivery Method): room air        MEDICATIONS  (STANDING):  acetaminophen     Tablet .. 975 milliGRAM(s) Oral every 6 hours  lactated ringers. 1000 milliLiter(s) (50 mL/Hr) IV Continuous <Continuous>  magnesium sulfate Infusion 1 Gm/Hr (25 mL/Hr) IV Continuous <Continuous>  NIFEdipine XL 30 milliGRAM(s) Oral daily    MEDICATIONS  (PRN):      Labs:  Blood type: A Positive  Rubella IgG: RPR: Negative                          10.9<L>   8.79 >-----------< 312    (  @ 12:03 )             35.5    24 @ 13:32      142  |  106  |  13  ----------------------------<  95  4.7   |  21<L>  |  0.87    24 @ 12:03      140  |  104  |  14  ----------------------------<  137<H>  3.4<L>   |  24  |  0.91        Ca    9.2      2024 13:32  Ca    9.3      2024 12:03  Mg     7.6<H>       Mg     7.1<H>         TPro  7.6  /  Alb  4.3  /  TBili  0.2  /  DBili  x   /  AST  17  /  ALT  12  /  AlkPhos  28<L>  24 @ 13:32  TPro  7.8  /  Alb  4.4  /  TBili  0.2  /  DBili  x   /  AST  21  /  ALT  15  /  AlkPhos  28<L>  24 @ 12:03          Physical Exam:  General: NAD  CV: RRR  Pulm: CTAB  Abdomen: soft, non-tender, non-distended, fundus firm  Vaginal: lochia wnl, exam deferred  Extremities: No erythema/calf tenderness

## 2024-02-25 LAB — CYTOLOGY CVX/VAG DOC THIN PREP: NORMAL

## 2024-02-28 ENCOUNTER — APPOINTMENT (OUTPATIENT)
Dept: OBGYN | Facility: CLINIC | Age: 39
End: 2024-02-28
Payer: COMMERCIAL

## 2024-02-28 VITALS — SYSTOLIC BLOOD PRESSURE: 152 MMHG | DIASTOLIC BLOOD PRESSURE: 89 MMHG

## 2024-02-28 VITALS — SYSTOLIC BLOOD PRESSURE: 143 MMHG | DIASTOLIC BLOOD PRESSURE: 88 MMHG

## 2024-02-28 PROCEDURE — 0503F POSTPARTUM CARE VISIT: CPT

## 2024-02-28 NOTE — HISTORY OF PRESENT ILLNESS
[FreeTextEntry1] : 2024. KIRILL MACHADO 38 year old female presents for pp BP f/u.  S/p  on 24, discharged on Procardia due to elevated BPs, potential pp PEC vs cHTN  Pt is doing well. Denies HA, blurry vision, SOB, or chest pain. At home BPs have all been normotensive at home 110s-20s. She is still on Procardia XL 30 mg. Today in office BPS 140s-150s/80s. Pt reports that she feels anxious.  Pt is on Zoloft 25 for PPD. She reports her moods have been much better on the medication. She has adequate support at home.

## 2024-02-28 NOTE — SIGNATURES
[TextEntry] : This note was written by Dionte Anderson on 02/28/2024 actively solely TAYLER Rios M.D.  All medical record entries made by the Scribe were at my, TAYLER Rios M.D. direction and personally dictated by me on 02/28/2024. I have personally reviewed the chart and agree that the record reflects my personal performance of the history, physical exam, assessment, and plan.

## 2024-02-28 NOTE — PLAN
[FreeTextEntry1] : #PP BP F/u -ddx: pp PEC vs. cHTN -C/w BP log. If BPs are >110/60, pt to take Procardia 30 XL. Hold parameters dicussed. Pt to c/w BP log. -Pt has consult w/ cardio OB to r/o chronic HTN vs pp PEC   #PP anxiety -Pt to up Zoloft to 50mg and f/u in 2 wks  RTO in 2 wks for f/u

## 2024-03-20 ENCOUNTER — NON-APPOINTMENT (OUTPATIENT)
Age: 39
End: 2024-03-20

## 2024-03-20 ENCOUNTER — APPOINTMENT (OUTPATIENT)
Dept: CARDIOLOGY | Facility: CLINIC | Age: 39
End: 2024-03-20
Payer: COMMERCIAL

## 2024-03-20 DIAGNOSIS — Z82.49 FAMILY HISTORY OF ISCHEMIC HEART DISEASE AND OTHER DISEASES OF THE CIRCULATORY SYSTEM: ICD-10-CM

## 2024-03-20 DIAGNOSIS — Z83.438 FAMILY HISTORY OF OTHER DISORDER OF LIPOPROTEIN METABOLISM AND OTHER LIPIDEMIA: ICD-10-CM

## 2024-03-20 DIAGNOSIS — E03.9 HYPOTHYROIDISM, UNSPECIFIED: ICD-10-CM

## 2024-03-20 PROCEDURE — 99203 OFFICE O/P NEW LOW 30 MIN: CPT

## 2024-03-25 PROBLEM — E03.9 HYPOTHYROID: Status: ACTIVE | Noted: 2022-06-24

## 2024-03-25 PROBLEM — Z82.49 FAMILY HISTORY OF ESSENTIAL HYPERTENSION: Status: ACTIVE | Noted: 2024-03-25

## 2024-03-25 PROBLEM — Z83.438 FAMILY HISTORY OF HYPERLIPIDEMIA: Status: ACTIVE | Noted: 2024-03-25

## 2024-03-25 NOTE — HISTORY OF PRESENT ILLNESS
[FreeTextEntry1] : Ms. Castillo is a 39 year old female that presents [facility] for blood pressure management postpartum.   She was induced on 2024 due to elevated blood pressures at 37 weeks gestation. Patient was discharged  on Procardia and readmitted 42 days postpartu with symptoms of preeclampsia-> treated with IV Magnesium for seizure prophylaxis.   All home BPs are reported to be well controlled on Procardia 30 mg QD. Blood pressure today:  122/ 74.  Additionally carries a diagnosis of hypothyroidsim and postpartum depression, treated with Zoloft 25 mg QD.   +Family history of Hypertension and Hyperlipidemia  Pregnancy history 1st pregnancy-  , Full term, , no complications 2nd pregnancy  , ectopic-> right tube rupture 3rd pregnancy   recent, , 37 weeks-> PEC, gHTN  Patient reports feeling generally well and denies any issues of shortness of breath, chest discomfort or palpitations.

## 2024-03-25 NOTE — ASSESSMENT
[FreeTextEntry1] : Ms. Castillo is a 39 year old female that presents to [facility] for blood pressure management postpartum.   She was induced on January 11, 2024 due to elevated blood pressures at 37 weeks gestation. Patient was discharged  on Procardia and readmitted 42 days postpartu with symptoms of preeclampsia-> treated with IV Magnesium for seizure prophylaxis.   #Gestational hypertension   Blood pressure reported today to be in good control   Requested BP log for one week and will titrate medication as needed   Continue on Nifedipine ER 30 mg daily.   Patient is to watch out for signs and symptoms of hypotension.   Watch sodium intake and increase fluid intake.   All home BPs are reported to be well controlled on Procardia 30 mg QD. Blood pressure today:  122/ 74.   #Postpartum depression   Managed by OB team   Continuing on Zoloft 50mg QD  #Hypothyroidism   Continuing  on Levothyroxine 75 mcg QD  #Adverse Cardiovascular Risk Factors  Personal history of PEC, gHTN Family history of Hypertension and hyperlipidemia  Recommending a baseline cardiovascular evaluation 3 months postpartum to assess risk In-office physical examination and 12 lead EKG Echocardiogram to assess cardiac structure and function Exercise stress testing to assess functional status Full blood work panel:  CBC,CMP, Hgb A1C, TSH, Lipid profile  - Encouraged patient to participate in  healthy exercise(when cleared by OB)  and eating habits, focusing on a Mediterranean style of eating and aiming for the recommended 150 minutes per week of moderate physical activity.  - Encouraged the patient to find healthy outlets and coping mechanisms to help manage stress, such as physical activity/exercise, reducing workload if possible, spending time with family and friends, engaging in an enjoyable hobby, or using meditation or mindfulness techniques.

## 2024-03-25 NOTE — REASON FOR VISIT
[Home] : at home, [unfilled] , at the time of the visit. [Other Location: e.g. Home (Enter Location, City,State)___] : at [unfilled] [Patient] : the patient [FreeTextEntry2] : Irish Castillo

## 2024-04-10 ENCOUNTER — APPOINTMENT (OUTPATIENT)
Dept: OBGYN | Facility: CLINIC | Age: 39
End: 2024-04-10
Payer: COMMERCIAL

## 2024-04-10 VITALS — DIASTOLIC BLOOD PRESSURE: 75 MMHG | SYSTOLIC BLOOD PRESSURE: 147 MMHG

## 2024-04-10 DIAGNOSIS — F41.8 OTHER MENTAL DISORDERS COMPLICATING THE PUERPERIUM: ICD-10-CM

## 2024-04-10 PROCEDURE — 99213 OFFICE O/P EST LOW 20 MIN: CPT

## 2024-04-10 RX ORDER — SERTRALINE 25 MG/1
25 TABLET, FILM COATED ORAL DAILY
Qty: 90 | Refills: 1 | Status: ACTIVE | COMMUNITY
Start: 2024-04-10 | End: 1900-01-01

## 2024-04-10 RX ORDER — SERTRALINE HYDROCHLORIDE 50 MG/1
50 TABLET, FILM COATED ORAL DAILY
Qty: 90 | Refills: 1 | Status: ACTIVE | COMMUNITY
Start: 2024-04-10 | End: 1900-01-01

## 2024-05-15 ENCOUNTER — APPOINTMENT (OUTPATIENT)
Dept: CV DIAGNOSITCS | Facility: HOSPITAL | Age: 39
End: 2024-05-15

## 2024-05-15 ENCOUNTER — APPOINTMENT (OUTPATIENT)
Dept: CARDIOLOGY | Facility: CLINIC | Age: 39
End: 2024-05-15
Payer: COMMERCIAL

## 2024-05-15 VITALS — OXYGEN SATURATION: 100 %

## 2024-05-15 DIAGNOSIS — R06.09 OTHER FORMS OF DYSPNEA: ICD-10-CM

## 2024-05-15 PROCEDURE — 99214 OFFICE O/P EST MOD 30 MIN: CPT | Mod: 25

## 2024-05-15 PROCEDURE — 93000 ELECTROCARDIOGRAM COMPLETE: CPT

## 2024-05-15 RX ORDER — NIFEDIPINE 30 MG/1
30 TABLET, FILM COATED, EXTENDED RELEASE ORAL DAILY
Qty: 90 | Refills: 3 | Status: ACTIVE | COMMUNITY
Start: 2024-02-22 | End: 1900-01-01

## 2024-05-15 NOTE — DISCUSSION/SUMMARY
[EKG obtained to assist in diagnosis and management of assessed problem(s)] : EKG obtained to assist in diagnosis and management of assessed problem(s) [FreeTextEntry1] : Ms. Castillo is a 39 year old female that [facility] for [facility] pressure management postpartum.   She was induced on 2024 due to elevated blood pressures at 37 weeks gestation. Patient was discharged  on Procardia and readmitted 42 days postpartu with symptoms of preeclampsia-> treated with IV Magnesium for seizure prophylaxis.   All home BPs are reported to be well controlled on Procardia 30 mg QD. Blood pressure today:  122/ 74.  Additionally carries a diagnosis of hypothyroidsim and postpartum depression, treated with Zoloft 25 mg QD.   +Family history of Hypertension and Hyperlipidemia  Pregnancy history 1st pregnancy-  , Full term, , no complications 2nd pregnancy  , ectopic-> right tube rupture 3rd pregnancy   recent, , 37 weeks-> PEC, gHTN  Patient reports feeling generally well and denies any issues of shortness of breath, chest discomfort or palpitations.  - Post Partum Preeclampsia - BP normal at home but she has not checked in a while - has run out of the procardia 2 weeks and has not been on meds - BP in the office - checked twice was > 140/90 - renewed the procardia and she will check her BP at home. If it is > 140/90, she will resume the meds - Encouraged the patient to monitor blood pressure at home, keep a log, and report results back to us for evaluation. Based on results, we will adjust the regimen as necessary. - patient is currently not breastfeeding - will refer for an echocardiogram to assess for diastolic issues - will refer for an exercise stress test in 3-6 months post delivery - will refer for routine fasting blood work in 3-6 months post delivery - low sodium diet recommended - once cleared by OBGYN advised to start walking/light exercise ~ 30 min/day - hydration advised

## 2024-05-22 ENCOUNTER — APPOINTMENT (OUTPATIENT)
Dept: OBGYN | Facility: CLINIC | Age: 39
End: 2024-05-22

## 2024-07-10 ENCOUNTER — RX RENEWAL (OUTPATIENT)
Age: 39
End: 2024-07-10

## 2024-07-15 ENCOUNTER — APPOINTMENT (OUTPATIENT)
Dept: CARDIOLOGY | Facility: CLINIC | Age: 39
End: 2024-07-15

## 2024-07-15 ENCOUNTER — APPOINTMENT (OUTPATIENT)
Dept: CV DIAGNOSTICS | Facility: HOSPITAL | Age: 39
End: 2024-07-15

## 2024-09-24 ENCOUNTER — RESULT REVIEW (OUTPATIENT)
Age: 39
End: 2024-09-24

## 2024-09-25 ENCOUNTER — APPOINTMENT (OUTPATIENT)
Dept: CV DIAGNOSTICS | Facility: HOSPITAL | Age: 39
End: 2024-09-25

## 2024-09-25 ENCOUNTER — RESULT REVIEW (OUTPATIENT)
Age: 39
End: 2024-09-25

## 2024-09-25 ENCOUNTER — APPOINTMENT (OUTPATIENT)
Dept: CV DIAGNOSITCS | Facility: HOSPITAL | Age: 39
End: 2024-09-25

## 2024-09-25 ENCOUNTER — OUTPATIENT (OUTPATIENT)
Dept: OUTPATIENT SERVICES | Facility: HOSPITAL | Age: 39
LOS: 1 days | End: 2024-09-25
Payer: COMMERCIAL

## 2024-09-25 DIAGNOSIS — R06.09 OTHER FORMS OF DYSPNEA: ICD-10-CM

## 2024-09-25 PROCEDURE — 93306 TTE W/DOPPLER COMPLETE: CPT

## 2024-09-25 PROCEDURE — 93017 CV STRESS TEST TRACING ONLY: CPT

## 2024-09-25 PROCEDURE — 93016 CV STRESS TEST SUPVJ ONLY: CPT

## 2024-09-25 PROCEDURE — 93356 MYOCRD STRAIN IMG SPCKL TRCK: CPT

## 2024-09-25 PROCEDURE — 93306 TTE W/DOPPLER COMPLETE: CPT | Mod: 26

## 2024-09-25 PROCEDURE — 93018 CV STRESS TEST I&R ONLY: CPT

## 2024-12-09 ENCOUNTER — APPOINTMENT (OUTPATIENT)
Dept: CARDIOLOGY | Facility: CLINIC | Age: 39
End: 2024-12-09

## 2024-12-20 NOTE — OB PROVIDER H&P - NS_PARA_OBGYN_ALL_OB_NU
Anesthesiologist present for case.  See Anesthesia Record for details regarding sedation/anesthesia, medications, and vital signs. 2

## 2025-05-14 ENCOUNTER — NON-APPOINTMENT (OUTPATIENT)
Age: 40
End: 2025-05-14

## 2025-05-16 ENCOUNTER — LABORATORY RESULT (OUTPATIENT)
Age: 40
End: 2025-05-16

## 2025-05-16 ENCOUNTER — ASOB RESULT (OUTPATIENT)
Age: 40
End: 2025-05-16

## 2025-05-16 ENCOUNTER — APPOINTMENT (OUTPATIENT)
Dept: OBGYN | Facility: CLINIC | Age: 40
End: 2025-05-16
Payer: COMMERCIAL

## 2025-05-16 VITALS — DIASTOLIC BLOOD PRESSURE: 90 MMHG | SYSTOLIC BLOOD PRESSURE: 140 MMHG

## 2025-05-16 VITALS — DIASTOLIC BLOOD PRESSURE: 100 MMHG | SYSTOLIC BLOOD PRESSURE: 168 MMHG

## 2025-05-16 DIAGNOSIS — N91.1 SECONDARY AMENORRHEA: ICD-10-CM

## 2025-05-16 LAB — HCG UR QL: POSITIVE

## 2025-05-16 PROCEDURE — 36415 COLL VENOUS BLD VENIPUNCTURE: CPT

## 2025-05-16 PROCEDURE — 76801 OB US < 14 WKS SINGLE FETUS: CPT

## 2025-05-16 PROCEDURE — 99213 OFFICE O/P EST LOW 20 MIN: CPT

## 2025-05-16 PROCEDURE — 81025 URINE PREGNANCY TEST: CPT

## 2025-05-19 LAB
ABORH: NORMAL
ALBUMIN SERPL ELPH-MCNC: 4.9 G/DL
ALP BLD-CCNC: 19 U/L
ALT SERPL-CCNC: 13 U/L
ANION GAP SERPL CALC-SCNC: 17 MMOL/L
AST SERPL-CCNC: 22 U/L
BASOPHILS # BLD AUTO: 0.01 K/UL
BASOPHILS NFR BLD AUTO: 0.2 %
BILIRUB SERPL-MCNC: 0.4 MG/DL
BUN SERPL-MCNC: 12 MG/DL
CALCIUM SERPL-MCNC: 9.3 MG/DL
CHLORIDE SERPL-SCNC: 102 MMOL/L
CO2 SERPL-SCNC: 19 MMOL/L
CREAT SERPL-MCNC: 0.92 MG/DL
EGFRCR SERPLBLD CKD-EPI 2021: 82 ML/MIN/1.73M2
EOSINOPHIL # BLD AUTO: 0.01 K/UL
EOSINOPHIL NFR BLD AUTO: 0.2 %
GLUCOSE SERPL-MCNC: 87 MG/DL
HCG SERPL-MCNC: 219 MIU/ML
HCG SERPL-MCNC: 235 MIU/ML
HCT VFR BLD CALC: 31.3 %
HGB BLD-MCNC: 10.1 G/DL
IMM GRANULOCYTES NFR BLD AUTO: 0.2 %
LYMPHOCYTES # BLD AUTO: 1.48 K/UL
LYMPHOCYTES NFR BLD AUTO: 22.6 %
MAN DIFF?: NORMAL
MCHC RBC-ENTMCNC: 21.9 PG
MCHC RBC-ENTMCNC: 32.3 G/DL
MCV RBC AUTO: 67.9 FL
MONOCYTES # BLD AUTO: 0.32 K/UL
MONOCYTES NFR BLD AUTO: 4.9 %
NEUTROPHILS # BLD AUTO: 4.72 K/UL
NEUTROPHILS NFR BLD AUTO: 71.9 %
PLATELET # BLD AUTO: 288 K/UL
POTASSIUM SERPL-SCNC: 4 MMOL/L
PROGEST SERPL-MCNC: 13.8 NG/ML
PROT SERPL-MCNC: 7.9 G/DL
RBC # BLD: 4.61 M/UL
RBC # FLD: 15.5 %
SODIUM SERPL-SCNC: 138 MMOL/L
WBC # FLD AUTO: 6.55 K/UL

## 2025-05-21 ENCOUNTER — APPOINTMENT (OUTPATIENT)
Dept: OBGYN | Facility: CLINIC | Age: 40
End: 2025-05-21
Payer: COMMERCIAL

## 2025-05-21 DIAGNOSIS — O36.80X0 PREGNANCY WITH INCONCLUSIVE FETAL VIABILITY, NOT APPLICABLE OR UNSPECIFIED: ICD-10-CM

## 2025-05-21 PROCEDURE — 36415 COLL VENOUS BLD VENIPUNCTURE: CPT

## 2025-05-21 PROCEDURE — 99213 OFFICE O/P EST LOW 20 MIN: CPT

## 2025-05-22 LAB — HCG SERPL-MCNC: 239 MIU/ML

## 2025-05-23 ENCOUNTER — RX RENEWAL (OUTPATIENT)
Age: 40
End: 2025-05-23

## 2025-05-25 LAB — HCG SERPL-MCNC: 179 MIU/ML

## 2025-05-27 ENCOUNTER — APPOINTMENT (OUTPATIENT)
Dept: OBGYN | Facility: CLINIC | Age: 40
End: 2025-05-27
Payer: COMMERCIAL

## 2025-05-27 ENCOUNTER — OUTPATIENT (OUTPATIENT)
Dept: OUTPATIENT SERVICES | Facility: HOSPITAL | Age: 40
LOS: 1 days | End: 2025-05-27
Payer: COMMERCIAL

## 2025-05-27 ENCOUNTER — ASOB RESULT (OUTPATIENT)
Age: 40
End: 2025-05-27

## 2025-05-27 VITALS
SYSTOLIC BLOOD PRESSURE: 136 MMHG | OXYGEN SATURATION: 98 % | RESPIRATION RATE: 16 BRPM | HEART RATE: 94 BPM | HEIGHT: 67 IN | DIASTOLIC BLOOD PRESSURE: 88 MMHG | TEMPERATURE: 98 F | WEIGHT: 136.91 LBS

## 2025-05-27 DIAGNOSIS — O02.1 MISSED ABORTION: ICD-10-CM

## 2025-05-27 DIAGNOSIS — I10 ESSENTIAL (PRIMARY) HYPERTENSION: ICD-10-CM

## 2025-05-27 DIAGNOSIS — Z98.890 OTHER SPECIFIED POSTPROCEDURAL STATES: Chronic | ICD-10-CM

## 2025-05-27 DIAGNOSIS — Z01.818 ENCOUNTER FOR OTHER PREPROCEDURAL EXAMINATION: ICD-10-CM

## 2025-05-27 DIAGNOSIS — Z90.49 ACQUIRED ABSENCE OF OTHER SPECIFIED PARTS OF DIGESTIVE TRACT: Chronic | ICD-10-CM

## 2025-05-27 LAB
ANION GAP SERPL CALC-SCNC: 14 MMOL/L — SIGNIFICANT CHANGE UP (ref 5–17)
BLD GP AB SCN SERPL QL: NEGATIVE — SIGNIFICANT CHANGE UP
BUN SERPL-MCNC: 18 MG/DL — SIGNIFICANT CHANGE UP (ref 7–23)
CALCIUM SERPL-MCNC: 9.7 MG/DL — SIGNIFICANT CHANGE UP (ref 8.4–10.5)
CHLORIDE SERPL-SCNC: 102 MMOL/L — SIGNIFICANT CHANGE UP (ref 96–108)
CO2 SERPL-SCNC: 24 MMOL/L — SIGNIFICANT CHANGE UP (ref 22–31)
CREAT SERPL-MCNC: 0.91 MG/DL — SIGNIFICANT CHANGE UP (ref 0.5–1.3)
EGFR: 83 ML/MIN/1.73M2 — SIGNIFICANT CHANGE UP
EGFR: 83 ML/MIN/1.73M2 — SIGNIFICANT CHANGE UP
GLUCOSE SERPL-MCNC: 115 MG/DL — HIGH (ref 70–99)
HCT VFR BLD CALC: 30.5 % — LOW (ref 34.5–45)
HGB BLD-MCNC: 9.7 G/DL — LOW (ref 11.5–15.5)
MCHC RBC-ENTMCNC: 22.5 PG — LOW (ref 27–34)
MCHC RBC-ENTMCNC: 31.8 G/DL — LOW (ref 32–36)
MCV RBC AUTO: 70.8 FL — LOW (ref 80–100)
NRBC BLD AUTO-RTO: 0 /100 WBCS — SIGNIFICANT CHANGE UP (ref 0–0)
PLATELET # BLD AUTO: 272 K/UL — SIGNIFICANT CHANGE UP (ref 150–400)
POTASSIUM SERPL-MCNC: 3.9 MMOL/L — SIGNIFICANT CHANGE UP (ref 3.5–5.3)
POTASSIUM SERPL-SCNC: 3.9 MMOL/L — SIGNIFICANT CHANGE UP (ref 3.5–5.3)
RBC # BLD: 4.31 M/UL — SIGNIFICANT CHANGE UP (ref 3.8–5.2)
RBC # FLD: 15.1 % — HIGH (ref 10.3–14.5)
RH IG SCN BLD-IMP: POSITIVE — SIGNIFICANT CHANGE UP
SODIUM SERPL-SCNC: 140 MMOL/L — SIGNIFICANT CHANGE UP (ref 135–145)
WBC # BLD: 5.1 K/UL — SIGNIFICANT CHANGE UP (ref 3.8–10.5)
WBC # FLD AUTO: 5.1 K/UL — SIGNIFICANT CHANGE UP (ref 3.8–10.5)

## 2025-05-27 PROCEDURE — 76830 TRANSVAGINAL US NON-OB: CPT

## 2025-05-27 PROCEDURE — 99213 OFFICE O/P EST LOW 20 MIN: CPT

## 2025-05-27 PROCEDURE — 80048 BASIC METABOLIC PNL TOTAL CA: CPT

## 2025-05-27 PROCEDURE — 86850 RBC ANTIBODY SCREEN: CPT

## 2025-05-27 PROCEDURE — 85027 COMPLETE CBC AUTOMATED: CPT

## 2025-05-27 PROCEDURE — 86900 BLOOD TYPING SEROLOGIC ABO: CPT

## 2025-05-27 PROCEDURE — 86901 BLOOD TYPING SEROLOGIC RH(D): CPT

## 2025-05-27 PROCEDURE — G0463: CPT

## 2025-05-27 RX ORDER — NIFEDIPINE 30 MG
1 TABLET, EXTENDED RELEASE 24 HR ORAL
Refills: 0 | DISCHARGE

## 2025-05-27 RX ORDER — PAROXETINE HYDROCHLORIDE 20 MG/1
1 TABLET, FILM COATED ORAL
Refills: 0 | DISCHARGE

## 2025-05-27 NOTE — H&P PST ADULT - HISTORY OF PRESENT ILLNESS
39 y/o F  7weeks gestation presents to PST for scheduled D&C for missed  on 25, Denies any palpitations, SOB, N/V, fever or chills.

## 2025-05-27 NOTE — H&P PST ADULT - NSICDXPASTMEDICALHX_GEN_ALL_CORE_FT
PAST MEDICAL HISTORY:  Bilobed placenta     History of ectopic pregnancy     Hypothyroidism     Missed       (normal spontaneous vaginal delivery)     Thalassemia minor

## 2025-05-28 LAB — HCG SERPL-MCNC: 25 MIU/ML

## 2025-05-29 ENCOUNTER — APPOINTMENT (OUTPATIENT)
Dept: OBGYN | Facility: CLINIC | Age: 40
End: 2025-05-29

## 2025-05-29 ENCOUNTER — APPOINTMENT (OUTPATIENT)
Dept: OBGYN | Facility: HOSPITAL | Age: 40
End: 2025-05-29

## 2025-06-02 ENCOUNTER — NON-APPOINTMENT (OUTPATIENT)
Age: 40
End: 2025-06-02

## 2025-06-04 ENCOUNTER — LABORATORY RESULT (OUTPATIENT)
Age: 40
End: 2025-06-04

## 2025-06-05 ENCOUNTER — APPOINTMENT (OUTPATIENT)
Dept: OBGYN | Facility: CLINIC | Age: 40
End: 2025-06-05

## (undated) DEVICE — UTERINE MANIPULATOR COOPER SURGICAL 5MM 33CM GREEN

## (undated) DEVICE — GLV 8.5 PROTEXIS (WHITE)

## (undated) DEVICE — UTERINE MANIPULATOR CLINICAL INNOVATIONS CLEARVIEW 7CM

## (undated) DEVICE — Device

## (undated) DEVICE — MEDICATION LABELS W MARKER

## (undated) DEVICE — TUBING INSUFFLATION LAP FILTER 10FT

## (undated) DEVICE — DRAPE INSTRUMENT POUCH 6.75" X 11"

## (undated) DEVICE — RUMI TIP BLUE 6.7MM X 8CM

## (undated) DEVICE — TROCAR COVIDIEN VERSASTEP PLUS 11MM STANDARD

## (undated) DEVICE — GLV 6.5 PROTEXIS (WHITE)

## (undated) DEVICE — VENODYNE/SCD SLEEVE CALF LARGE

## (undated) DEVICE — PACK GYN LAPAROSCOPY

## (undated) DEVICE — PREP BETADINE KIT

## (undated) DEVICE — SUT VLOC 180 0 12" GS-21 GREEN

## (undated) DEVICE — BLADE SCALPEL SAFETYLOCK #10

## (undated) DEVICE — POSITIONER FOAM EGG CRATE ULNAR 2PCS (PINK)

## (undated) DEVICE — SPECIMEN CONTAINER 100ML

## (undated) DEVICE — SOL IRR POUR H2O 250ML

## (undated) DEVICE — INSUFFLATION NDL COVIDIEN STEP 14G FOR STEP/VERSASTEP

## (undated) DEVICE — TROCAR APPLIED MEDICAL KII BALLOON BLUNT TIP 12MM X 100MM

## (undated) DEVICE — APPLICATOR ENDOSCOPIC FOR SUGIFLO

## (undated) DEVICE — FOLEY TRAY 16FR LF URINE METER SURESTEP

## (undated) DEVICE — GLV 8 PROTEXIS (WHITE)

## (undated) DEVICE — LIGASURE BLUNT TIP 37CM

## (undated) DEVICE — GOWN TRIMAX LG

## (undated) DEVICE — DRAPE MAYO STAND 30"

## (undated) DEVICE — SUCTION YANKAUER NO CONTROL VENT

## (undated) DEVICE — TUBING SUCTION 20FT

## (undated) DEVICE — GLV 7.5 PROTEXIS (WHITE)

## (undated) DEVICE — FOLEY TRAY 16FR 5CC LTX UMETER CLOSED

## (undated) DEVICE — TROCAR COVIDIEN VERSAONE BLADED FIXATION 11MM STANDARD

## (undated) DEVICE — WARMING BLANKET UPPER ADULT

## (undated) DEVICE — PREP BETADINE SPONGE STICKS

## (undated) DEVICE — LAPSAC SURGICAL TISSUE POUCH 8X10"

## (undated) DEVICE — BLADE SCALPEL SAFETYLOCK #15

## (undated) DEVICE — TUBING STRYKEFLOW II SUCTION / IRRIGATOR

## (undated) DEVICE — TROCAR GELPOINT MINI ADVANCED

## (undated) DEVICE — SYR ASEPTO

## (undated) DEVICE — VALVE YELLOW PORT SEAL PLUS 5MM

## (undated) DEVICE — VISITEC 4X4

## (undated) DEVICE — PREP CHLORAPREP HI-LITE ORANGE 26ML

## (undated) DEVICE — MARKING PEN W RULER

## (undated) DEVICE — DRSG STERISTRIPS 0.5 X 4"

## (undated) DEVICE — SOL IRR POUR NS 0.9% 500ML

## (undated) DEVICE — DRAPE 3/4 SHEET W REINFORCEMENT 56X77"

## (undated) DEVICE — SUT VLOC 90 2-0 9" GS-22 UNDYED

## (undated) DEVICE — LAP PAD 18 X 18"

## (undated) DEVICE — ENDOCATCH 10MM SPECIMEN POUCH

## (undated) DEVICE — DRAPE LIGHT HANDLE COVER (BLUE)

## (undated) DEVICE — TROCAR COVIDIEN BLUNT TIP HASSAN 10MM

## (undated) DEVICE — GLV 7 PROTEXIS (WHITE)

## (undated) DEVICE — DRAPE TOWEL BLUE 17" X 24"